# Patient Record
Sex: FEMALE | Race: ASIAN | HISPANIC OR LATINO | URBAN - METROPOLITAN AREA
[De-identification: names, ages, dates, MRNs, and addresses within clinical notes are randomized per-mention and may not be internally consistent; named-entity substitution may affect disease eponyms.]

---

## 2017-01-27 ENCOUNTER — EMERGENCY (EMERGENCY)
Facility: HOSPITAL | Age: 32
LOS: 1 days | Discharge: HOME | End: 2017-01-27
Attending: EMERGENCY MEDICINE | Admitting: EMERGENCY MEDICINE
Payer: MEDICAID

## 2017-01-27 VITALS
SYSTOLIC BLOOD PRESSURE: 134 MMHG | TEMPERATURE: 98 F | RESPIRATION RATE: 15 BRPM | OXYGEN SATURATION: 100 % | DIASTOLIC BLOOD PRESSURE: 95 MMHG | HEART RATE: 73 BPM

## 2017-01-27 PROCEDURE — 99283 EMERGENCY DEPT VISIT LOW MDM: CPT | Mod: 25

## 2017-01-27 PROCEDURE — 10120 INC&RMVL FB SUBQ TISS SMPL: CPT

## 2017-01-27 RX ADMIN — Medication 1 TABLET(S): at 23:04

## 2017-01-27 NOTE — ED PROVIDER NOTE - NS ED MD SCRIBE ATTENDING SCRIBE SECTIONS
PAST MEDICAL/SURGICAL/SOCIAL HISTORY/HISTORY OF PRESENT ILLNESS/DISPOSITION/VITAL SIGNS( Pullset)/REVIEW OF SYSTEMS/HIV/PHYSICAL EXAM

## 2017-01-27 NOTE — ED PROCEDURE NOTE - DETAILS:
Dr. Baird:  I have personally performed a face to face bedside history and physical examination of this patient. I have discussed the history, examination, review of systems, assessment and plan of management with the resident. I have reviewed the electronic medical record and amended it to reflect my history, review of systems, physical exam, assessment and plan.

## 2017-01-27 NOTE — ED ADULT TRIAGE NOTE - CHIEF COMPLAINT QUOTE
alert no distress  c/o toothache started 1 week ago now has persistent headache right ear and jaw pain all intermittent   took advil at 1800

## 2017-01-27 NOTE — ED PROVIDER NOTE - OBJECTIVE STATEMENT
30 yo F pt w/ no significant PMHx presents to the ED c/o upper right toothache x 1 week. Now complaining of persistent headache, intermittent ear/jaw pain. Has not seen dentist. Pt also endorses skin growing over the back piercing on upper lip - requests it be removed. Denies fevers, chills.

## 2017-01-27 NOTE — ED PROCEDURE NOTE - CPROC ED FOREIGN BODY DETAIL1
The area was draped and prepped and the anatomic location of the suspected foreign body was explored in a bloodless field.

## 2017-01-27 NOTE — ED PROCEDURE NOTE - CPROC ED POST PROC CARE GUIDE1
Verbal/written post procedure instructions were given to patient/caregiver./Instructed patient/caregiver to follow-up with primary care physician./Keep the cast/splint/dressing clean and dry./Instructed patient/caregiver regarding signs and symptoms of infection.

## 2017-01-27 NOTE — ED PROVIDER NOTE - MEDICAL DECISION MAKING DETAILS
30 yo M pt w/ likely pain from dental curtis. Plan for abx. Will remove upper lip stud as per pt requests. F/u dentist. 30 yo M pt w/ likely pain from dental caries. Plan for abx. Will remove upper lip stud as per pt requests. F/u dentist.  Discharge with return precautions.

## 2017-11-30 ENCOUNTER — APPOINTMENT (OUTPATIENT)
Dept: INTERNAL MEDICINE | Facility: CLINIC | Age: 32
End: 2017-11-30
Payer: COMMERCIAL

## 2017-11-30 VITALS
HEART RATE: 64 BPM | RESPIRATION RATE: 16 BRPM | TEMPERATURE: 98.4 F | BODY MASS INDEX: 24.16 KG/M2 | HEIGHT: 65 IN | DIASTOLIC BLOOD PRESSURE: 82 MMHG | SYSTOLIC BLOOD PRESSURE: 126 MMHG | OXYGEN SATURATION: 98 % | WEIGHT: 145 LBS

## 2017-11-30 DIAGNOSIS — Z82.49 FAMILY HISTORY OF ISCHEMIC HEART DISEASE AND OTHER DISEASES OF THE CIRCULATORY SYSTEM: ICD-10-CM

## 2017-11-30 DIAGNOSIS — Z78.9 OTHER SPECIFIED HEALTH STATUS: ICD-10-CM

## 2017-11-30 PROCEDURE — 99385 PREV VISIT NEW AGE 18-39: CPT | Mod: 25

## 2017-11-30 PROCEDURE — 90686 IIV4 VACC NO PRSV 0.5 ML IM: CPT

## 2017-11-30 PROCEDURE — 90471 IMMUNIZATION ADMIN: CPT

## 2017-12-05 LAB
ALBUMIN SERPL ELPH-MCNC: 4.8 G/DL
ALP BLD-CCNC: 68 U/L
ALT SERPL-CCNC: 13 U/L
ANION GAP SERPL CALC-SCNC: 19 MMOL/L
AST SERPL-CCNC: 23 U/L
BASOPHILS # BLD AUTO: 0.01 K/UL
BASOPHILS NFR BLD AUTO: 0.1 %
BILIRUB SERPL-MCNC: 0.6 MG/DL
BUN SERPL-MCNC: 12 MG/DL
CALCIUM SERPL-MCNC: 10.2 MG/DL
CHLORIDE SERPL-SCNC: 102 MMOL/L
CHOLEST SERPL-MCNC: 206 MG/DL
CHOLEST/HDLC SERPL: 3.1 RATIO
CO2 SERPL-SCNC: 21 MMOL/L
CREAT SERPL-MCNC: 1.04 MG/DL
EOSINOPHIL # BLD AUTO: 0.13 K/UL
EOSINOPHIL NFR BLD AUTO: 1.9 %
GLUCOSE SERPL-MCNC: 96 MG/DL
HCT VFR BLD CALC: 44.2 %
HDLC SERPL-MCNC: 67 MG/DL
HGB BLD-MCNC: 14.6 G/DL
IMM GRANULOCYTES NFR BLD AUTO: 0.3 %
LDLC SERPL CALC-MCNC: 113 MG/DL
LYMPHOCYTES # BLD AUTO: 2.44 K/UL
LYMPHOCYTES NFR BLD AUTO: 34.8 %
MAN DIFF?: NORMAL
MCHC RBC-ENTMCNC: 30 PG
MCHC RBC-ENTMCNC: 33 GM/DL
MCV RBC AUTO: 90.9 FL
MONOCYTES # BLD AUTO: 0.29 K/UL
MONOCYTES NFR BLD AUTO: 4.1 %
NEUTROPHILS # BLD AUTO: 4.12 K/UL
NEUTROPHILS NFR BLD AUTO: 58.8 %
PLATELET # BLD AUTO: 383 K/UL
POTASSIUM SERPL-SCNC: 4.5 MMOL/L
PROT SERPL-MCNC: 8.6 G/DL
RBC # BLD: 4.86 M/UL
RBC # FLD: 13.5 %
SODIUM SERPL-SCNC: 142 MMOL/L
T4 FREE SERPL-MCNC: 1.4 NG/DL
TRIGL SERPL-MCNC: 132 MG/DL
TSH SERPL-ACNC: 1.15 UIU/ML
WBC # FLD AUTO: 7.01 K/UL

## 2017-12-11 ENCOUNTER — TRANSCRIPTION ENCOUNTER (OUTPATIENT)
Age: 32
End: 2017-12-11

## 2018-03-01 ENCOUNTER — TRANSCRIPTION ENCOUNTER (OUTPATIENT)
Age: 33
End: 2018-03-01

## 2018-03-02 ENCOUNTER — TRANSCRIPTION ENCOUNTER (OUTPATIENT)
Age: 33
End: 2018-03-02

## 2018-03-02 ENCOUNTER — INPATIENT (INPATIENT)
Facility: HOSPITAL | Age: 33
LOS: 1 days | Discharge: HOME | DRG: 607 | End: 2018-03-04
Attending: INTERNAL MEDICINE | Admitting: INTERNAL MEDICINE
Payer: COMMERCIAL

## 2018-03-02 VITALS
RESPIRATION RATE: 16 BRPM | DIASTOLIC BLOOD PRESSURE: 70 MMHG | SYSTOLIC BLOOD PRESSURE: 100 MMHG | TEMPERATURE: 98 F | WEIGHT: 139.99 LBS | OXYGEN SATURATION: 98 % | HEART RATE: 64 BPM

## 2018-03-02 LAB
ALBUMIN SERPL ELPH-MCNC: 4.1 G/DL — SIGNIFICANT CHANGE UP (ref 3.3–5)
ALP SERPL-CCNC: 365 U/L — HIGH (ref 40–120)
ALT FLD-CCNC: 380 U/L RC — HIGH (ref 10–45)
ANION GAP SERPL CALC-SCNC: 18 MMOL/L — HIGH (ref 5–17)
AST SERPL-CCNC: 675 U/L — HIGH (ref 10–40)
BILIRUB SERPL-MCNC: 1 MG/DL — SIGNIFICANT CHANGE UP (ref 0.2–1.2)
BUN SERPL-MCNC: 18 MG/DL — SIGNIFICANT CHANGE UP (ref 7–23)
CALCIUM SERPL-MCNC: 8.6 MG/DL — SIGNIFICANT CHANGE UP (ref 8.4–10.5)
CHLORIDE SERPL-SCNC: 92 MMOL/L — LOW (ref 96–108)
CO2 SERPL-SCNC: 21 MMOL/L — LOW (ref 22–31)
CREAT SERPL-MCNC: 1.4 MG/DL — HIGH (ref 0.5–1.3)
GLUCOSE SERPL-MCNC: 106 MG/DL — HIGH (ref 70–99)
HCT VFR BLD CALC: 44.9 % — SIGNIFICANT CHANGE UP (ref 34.5–45)
HGB BLD-MCNC: 15.6 G/DL — HIGH (ref 11.5–15.5)
MCHC RBC-ENTMCNC: 31.7 PG — SIGNIFICANT CHANGE UP (ref 27–34)
MCHC RBC-ENTMCNC: 34.7 GM/DL — SIGNIFICANT CHANGE UP (ref 32–36)
MCV RBC AUTO: 91.2 FL — SIGNIFICANT CHANGE UP (ref 80–100)
PLATELET # BLD AUTO: 164 K/UL — SIGNIFICANT CHANGE UP (ref 150–400)
POTASSIUM SERPL-MCNC: 4 MMOL/L — SIGNIFICANT CHANGE UP (ref 3.5–5.3)
POTASSIUM SERPL-SCNC: 4 MMOL/L — SIGNIFICANT CHANGE UP (ref 3.5–5.3)
PROT SERPL-MCNC: 8.7 G/DL — HIGH (ref 6–8.3)
RBC # BLD: 4.93 M/UL — SIGNIFICANT CHANGE UP (ref 3.8–5.2)
RBC # FLD: 11.6 % — SIGNIFICANT CHANGE UP (ref 10.3–14.5)
SODIUM SERPL-SCNC: 131 MMOL/L — LOW (ref 135–145)
WBC # BLD: 2.4 K/UL — LOW (ref 3.8–10.5)
WBC # FLD AUTO: 2.4 K/UL — LOW (ref 3.8–10.5)

## 2018-03-02 PROCEDURE — 99285 EMERGENCY DEPT VISIT HI MDM: CPT | Mod: 25

## 2018-03-02 RX ORDER — DIPHENHYDRAMINE HCL 50 MG
25 CAPSULE ORAL ONCE
Qty: 0 | Refills: 0 | Status: COMPLETED | OUTPATIENT
Start: 2018-03-02 | End: 2018-03-02

## 2018-03-02 RX ORDER — ONDANSETRON 8 MG/1
4 TABLET, FILM COATED ORAL ONCE
Qty: 0 | Refills: 0 | Status: COMPLETED | OUTPATIENT
Start: 2018-03-02 | End: 2018-03-02

## 2018-03-02 RX ORDER — SODIUM CHLORIDE 9 MG/ML
1000 INJECTION INTRAMUSCULAR; INTRAVENOUS; SUBCUTANEOUS ONCE
Qty: 0 | Refills: 0 | Status: COMPLETED | OUTPATIENT
Start: 2018-03-02 | End: 2018-03-02

## 2018-03-02 RX ORDER — ACETAMINOPHEN 500 MG
650 TABLET ORAL ONCE
Qty: 0 | Refills: 0 | Status: COMPLETED | OUTPATIENT
Start: 2018-03-02 | End: 2018-03-02

## 2018-03-02 RX ORDER — FAMOTIDINE 10 MG/ML
20 INJECTION INTRAVENOUS ONCE
Qty: 0 | Refills: 0 | Status: COMPLETED | OUTPATIENT
Start: 2018-03-02 | End: 2018-03-02

## 2018-03-02 RX ADMIN — Medication 650 MILLIGRAM(S): at 22:20

## 2018-03-02 RX ADMIN — Medication 25 MILLIGRAM(S): at 22:21

## 2018-03-02 RX ADMIN — SODIUM CHLORIDE 2000 MILLILITER(S): 9 INJECTION INTRAMUSCULAR; INTRAVENOUS; SUBCUTANEOUS at 22:21

## 2018-03-02 RX ADMIN — ONDANSETRON 4 MILLIGRAM(S): 8 TABLET, FILM COATED ORAL at 22:21

## 2018-03-02 NOTE — ED PROVIDER NOTE - OBJECTIVE STATEMENT
32F with no PMH presenting with fever x2 days with worsening rash with new onset nausea and vomiting today. Patient was seen in urgent care 2 days ago for symptoms and was prescribed Tamiflu after testing negative on UA, rapid strep, and mono. Patient states that she recently completed a course of Bactrim for UTI several days ago. Rash began on the same day as when Bactrim was discontinued. Nausea and vomiting began today. LMP today. Denies chills, cp, sob, sore throat, diarrhea, sick contacts, immunizations up to date.

## 2018-03-02 NOTE — ED ADULT NURSE NOTE - OBJECTIVE STATEMENT
Received patient awake and alert x 4, presenting to the ED with n/v +fever x 2 days. Patient states she went to an urgent care 2 days ago and was prescribed Tamiflu for her symptoms. As per patient she had recently completed her course of Bactrim for UTI. +rash on B/L extremities. As per patient rash started when the Bactrim was complete. Denies any chills, no CP/SOB. Breathing unlabored with no S/S acute distress noted, safety maintained, will continue to monitor.

## 2018-03-02 NOTE — ED PROVIDER NOTE - ATTENDING CONTRIBUTION TO CARE
I have seen and evaluated this patient with the resident.   I agree with the findings  unless other wise stated.  I have made appropriate changes in documentations where needed, After my face to face bedside evaluation, I am further  notinF presenting with rash, nausea, vomiting. Possible drug reaction. Will order cbc, cmp, fluids, benadryl, tylenol, and reassessed pt has elevated LFT GB US and follow up possible hepatitis for obs --Stewart

## 2018-03-02 NOTE — ED PROVIDER NOTE - MEDICAL DECISION MAKING DETAILS
32F presenting with rash, nausea, vomiting. Possible drug reaction. Will order cbc, cmp, fluids, benadryl, tylenol, and reassess

## 2018-03-02 NOTE — ED PROVIDER NOTE - PROGRESS NOTE DETAILS
Attending MD Del Valle.  Pt signed out to me in stable condition pending Labs, hydrate, send home, UTI recently, took 1 wk bactrim, stopped 2 days ago because developed drug rash, pruritic, no throat/lung involvement, dry appearing, limited PO, check labs/electrolytes, completed course bactrim, no further abxs necessary given completion of course and resolution of UTI sxs. Janki: patient with improvement of symptoms. Labwork significant for elevated LFTs. No hx of alcohol use or hepatitis. Will order RUQ US and hepatitis panel Janki: US significant for cholelithiasis with mild intra hepatic ductal dilitation. Patient to be considered for CDU for MRCP Janki: US significant for cholelithiasis with mild intra hepatic ductal dilitation. Patient with no hx of abdominal pain. Benign abdominal exam. Patient to be considered for CDU for MRCP ARMY:  PT seen for prob drug rash s/p completion of bactrim course for UTI tx.  Pt's LFT's found to be elevated and RUQ sono c/w cholelithiasis and dilated intrahepatic ducts.  MRCP for further eval warranted.  Hepatitis panel ordered.  Planned MRCP, hepatitis panel, repeat labs and reassessment in AM.  Stable for transfer of care to CDU/observation unit for above.

## 2018-03-02 NOTE — ED PROVIDER NOTE - CARE PLAN
Principal Discharge DX:	Elevated LFTs Principal Discharge DX:	Elevated LFTs  Secondary Diagnosis:	Febrile illness, acute

## 2018-03-02 NOTE — ED PROVIDER NOTE - PMH
No pertinent past medical history No pertinent past medical history    No pertinent past medical history

## 2018-03-03 DIAGNOSIS — R21 RASH AND OTHER NONSPECIFIC SKIN ERUPTION: ICD-10-CM

## 2018-03-03 DIAGNOSIS — R79.89 OTHER SPECIFIED ABNORMAL FINDINGS OF BLOOD CHEMISTRY: ICD-10-CM

## 2018-03-03 LAB
ALBUMIN SERPL ELPH-MCNC: 3.2 G/DL — LOW (ref 3.3–5)
ALP SERPL-CCNC: 320 U/L — HIGH (ref 40–120)
ALT FLD-CCNC: 306 U/L RC — HIGH (ref 10–45)
ANION GAP SERPL CALC-SCNC: 17 MMOL/L — SIGNIFICANT CHANGE UP (ref 5–17)
AST SERPL-CCNC: 537 U/L — HIGH (ref 10–40)
BASOPHILS # BLD AUTO: 0 K/UL — SIGNIFICANT CHANGE UP (ref 0–0.2)
BILIRUB SERPL-MCNC: 1 MG/DL — SIGNIFICANT CHANGE UP (ref 0.2–1.2)
BUN SERPL-MCNC: 13 MG/DL — SIGNIFICANT CHANGE UP (ref 7–23)
CALCIUM SERPL-MCNC: 7.5 MG/DL — LOW (ref 8.4–10.5)
CHLORIDE SERPL-SCNC: 100 MMOL/L — SIGNIFICANT CHANGE UP (ref 96–108)
CMV IGG FLD QL: <0.2 U/ML — SIGNIFICANT CHANGE UP
CMV IGG SERPL-IMP: NEGATIVE — SIGNIFICANT CHANGE UP
CMV IGM FLD-ACNC: <8 AU/ML — SIGNIFICANT CHANGE UP
CMV IGM SERPL QL: NEGATIVE — SIGNIFICANT CHANGE UP
CO2 SERPL-SCNC: 18 MMOL/L — LOW (ref 22–31)
CREAT SERPL-MCNC: 1.06 MG/DL — SIGNIFICANT CHANGE UP (ref 0.5–1.3)
EBV EA AB SER IA-ACNC: 11.8 U/ML — HIGH
EBV EA AB TITR SER IF: POSITIVE
EBV EA IGG SER-ACNC: POSITIVE
EBV NA IGG SER IA-ACNC: 200 U/ML — HIGH
EBV PATRN SPEC IB-IMP: SIGNIFICANT CHANGE UP
EBV VCA IGG AVIDITY SER QL IA: POSITIVE
EBV VCA IGM SER IA-ACNC: 248 U/ML — HIGH
EBV VCA IGM SER IA-ACNC: <10 U/ML — SIGNIFICANT CHANGE UP
EBV VCA IGM TITR FLD: NEGATIVE — SIGNIFICANT CHANGE UP
EOSINOPHIL # BLD AUTO: 0 K/UL — SIGNIFICANT CHANGE UP (ref 0–0.5)
EOSINOPHIL NFR BLD AUTO: 1 % — SIGNIFICANT CHANGE UP (ref 0–6)
GLUCOSE SERPL-MCNC: 93 MG/DL — SIGNIFICANT CHANGE UP (ref 70–99)
HAV IGM SER-ACNC: SIGNIFICANT CHANGE UP
HBV CORE IGM SER-ACNC: SIGNIFICANT CHANGE UP
HBV SURFACE AG SER-ACNC: SIGNIFICANT CHANGE UP
HCG SERPL-ACNC: <2 MIU/ML — LOW (ref 5–24)
HCT VFR BLD CALC: 38.1 % — SIGNIFICANT CHANGE UP (ref 34.5–45)
HCV AB S/CO SERPL IA: 0.13 S/CO — SIGNIFICANT CHANGE UP
HCV AB SERPL-IMP: SIGNIFICANT CHANGE UP
HETEROPH AB TITR SER AGGL: NEGATIVE — SIGNIFICANT CHANGE UP
HGB BLD-MCNC: 13.5 G/DL — SIGNIFICANT CHANGE UP (ref 11.5–15.5)
HIV 1 & 2 AB SERPL IA.RAPID: SIGNIFICANT CHANGE UP
LIDOCAIN IGE QN: 56 U/L — SIGNIFICANT CHANGE UP (ref 7–60)
LYMPHOCYTES # BLD AUTO: 0.3 K/UL — LOW (ref 1–3.3)
LYMPHOCYTES # BLD AUTO: 15 % — SIGNIFICANT CHANGE UP (ref 13–44)
MCHC RBC-ENTMCNC: 32.1 PG — SIGNIFICANT CHANGE UP (ref 27–34)
MCHC RBC-ENTMCNC: 35.5 GM/DL — SIGNIFICANT CHANGE UP (ref 32–36)
MCV RBC AUTO: 90.5 FL — SIGNIFICANT CHANGE UP (ref 80–100)
MONOCYTES # BLD AUTO: 0 K/UL — SIGNIFICANT CHANGE UP (ref 0–0.9)
MONOCYTES NFR BLD AUTO: 4 % — SIGNIFICANT CHANGE UP (ref 2–14)
NEUTROPHILS # BLD AUTO: 2 K/UL — SIGNIFICANT CHANGE UP (ref 1.8–7.4)
NEUTROPHILS NFR BLD AUTO: 69 % — SIGNIFICANT CHANGE UP (ref 43–77)
PLATELET # BLD AUTO: 151 K/UL — SIGNIFICANT CHANGE UP (ref 150–400)
POTASSIUM SERPL-MCNC: 3.5 MMOL/L — SIGNIFICANT CHANGE UP (ref 3.5–5.3)
POTASSIUM SERPL-SCNC: 3.5 MMOL/L — SIGNIFICANT CHANGE UP (ref 3.5–5.3)
PROT SERPL-MCNC: 7 G/DL — SIGNIFICANT CHANGE UP (ref 6–8.3)
RAPID RVP RESULT: SIGNIFICANT CHANGE UP
RBC # BLD: 4.21 M/UL — SIGNIFICANT CHANGE UP (ref 3.8–5.2)
RBC # FLD: 11.6 % — SIGNIFICANT CHANGE UP (ref 10.3–14.5)
SODIUM SERPL-SCNC: 135 MMOL/L — SIGNIFICANT CHANGE UP (ref 135–145)
WBC # BLD: 1.6 K/UL — LOW (ref 3.8–10.5)
WBC # FLD AUTO: 1.6 K/UL — LOW (ref 3.8–10.5)

## 2018-03-03 PROCEDURE — 99222 1ST HOSP IP/OBS MODERATE 55: CPT | Mod: GC

## 2018-03-03 PROCEDURE — 71046 X-RAY EXAM CHEST 2 VIEWS: CPT | Mod: 26

## 2018-03-03 PROCEDURE — 99220: CPT

## 2018-03-03 PROCEDURE — 76705 ECHO EXAM OF ABDOMEN: CPT | Mod: 26

## 2018-03-03 PROCEDURE — 74181 MRI ABDOMEN W/O CONTRAST: CPT | Mod: 26

## 2018-03-03 RX ORDER — ACETAMINOPHEN 500 MG
650 TABLET ORAL ONCE
Qty: 0 | Refills: 0 | Status: COMPLETED | OUTPATIENT
Start: 2018-03-03 | End: 2018-03-03

## 2018-03-03 RX ORDER — SODIUM CHLORIDE 9 MG/ML
1000 INJECTION INTRAMUSCULAR; INTRAVENOUS; SUBCUTANEOUS
Qty: 0 | Refills: 0 | Status: DISCONTINUED | OUTPATIENT
Start: 2018-03-03 | End: 2018-03-04

## 2018-03-03 RX ORDER — IBUPROFEN 200 MG
600 TABLET ORAL ONCE
Qty: 0 | Refills: 0 | Status: COMPLETED | OUTPATIENT
Start: 2018-03-03 | End: 2018-03-03

## 2018-03-03 RX ORDER — DIPHENHYDRAMINE HCL 50 MG
50 CAPSULE ORAL EVERY 4 HOURS
Qty: 0 | Refills: 0 | Status: DISCONTINUED | OUTPATIENT
Start: 2018-03-03 | End: 2018-03-04

## 2018-03-03 RX ORDER — KETOROLAC TROMETHAMINE 30 MG/ML
30 SYRINGE (ML) INJECTION ONCE
Qty: 0 | Refills: 0 | Status: DISCONTINUED | OUTPATIENT
Start: 2018-03-03 | End: 2018-03-03

## 2018-03-03 RX ORDER — ACETAMINOPHEN 500 MG
650 TABLET ORAL EVERY 6 HOURS
Qty: 0 | Refills: 0 | Status: DISCONTINUED | OUTPATIENT
Start: 2018-03-03 | End: 2018-03-04

## 2018-03-03 RX ORDER — IBUPROFEN 200 MG
1 TABLET ORAL
Qty: 0 | Refills: 0 | COMMUNITY

## 2018-03-03 RX ORDER — HEPARIN SODIUM 5000 [USP'U]/ML
5000 INJECTION INTRAVENOUS; SUBCUTANEOUS EVERY 12 HOURS
Qty: 0 | Refills: 0 | Status: DISCONTINUED | OUTPATIENT
Start: 2018-03-03 | End: 2018-03-04

## 2018-03-03 RX ORDER — ONDANSETRON 8 MG/1
4 TABLET, FILM COATED ORAL ONCE
Qty: 0 | Refills: 0 | Status: COMPLETED | OUTPATIENT
Start: 2018-03-03 | End: 2018-03-03

## 2018-03-03 RX ADMIN — Medication 600 MILLIGRAM(S): at 08:45

## 2018-03-03 RX ADMIN — Medication 650 MILLIGRAM(S): at 03:53

## 2018-03-03 RX ADMIN — SODIUM CHLORIDE 120 MILLILITER(S): 9 INJECTION INTRAMUSCULAR; INTRAVENOUS; SUBCUTANEOUS at 04:13

## 2018-03-03 RX ADMIN — Medication 650 MILLIGRAM(S): at 19:03

## 2018-03-03 RX ADMIN — ONDANSETRON 4 MILLIGRAM(S): 8 TABLET, FILM COATED ORAL at 07:55

## 2018-03-03 RX ADMIN — Medication 600 MILLIGRAM(S): at 07:55

## 2018-03-03 RX ADMIN — FAMOTIDINE 20 MILLIGRAM(S): 10 INJECTION INTRAVENOUS at 00:13

## 2018-03-03 RX ADMIN — Medication 30 MILLIGRAM(S): at 15:00

## 2018-03-03 RX ADMIN — Medication 30 MILLIGRAM(S): at 14:30

## 2018-03-03 NOTE — CONSULT NOTE ADULT - ASSESSMENT
32F with no PMH presenting with fever x2 days with worsening rash with new onset nausea and vomiting today. Patient was seen in urgent care 2 days ago for symptoms and was prescribed Tamiflu after testing negative on UA, rapid strep, and mono. Patient states that she recently completed a course of Bactrim for UTI several days ago. Rash began on the same day as when Bactrim was discontinued. Nausea and vomiting began today. LMP today. Denies chills, cp,    timeline- uti symtoms- oupt abx  course, did not improve- 4 to 5 days later- bactrim received 6 days, last dose monday- fever while on bactrim, and rash on wednesday, wednesday-fever and non speicific symptoms- urgent care- tamiflu- nausea and vomitting- noticed rash on chest and arms itching, now itching gone but rash persists    vitals-99 low grade  labs- leukopenic,  liver enzyme altered  cmv serology and EBV serology sent   hiv negative     We think that the rash, fever came as side effect of bactrim, the timeline is very suggestive of that, no eosinophils though but that does not rule in or out,   No other etiology obvious on hisotry like rheum, can send rheum wok up like esr, crp, marta to be sure,   send blood cx in the meantime, since she has a rash, and low grade temp here   will f/u ebv and c,mv  will dw attending 32F with no PMH, recent UTI outpatient, was given some antibiotic that was not effective so her GYN gave her bactrim but on bactrim she developed fever, rash, nausea and vomiting, she stopped the bactrim and went to urgent care, RVP, strep and u/a were negative but was still given tamiflu.  Now rash is improving, no more fever or vomiting, no urinary symptoms, was found to have leukopenia 1. 6 with 11 bandemia, HIV negative    fever, rash, vomiting likely drug reaction to bactrim, already improving after discontinuing the bactrim  f/u the CMV and EBV  monitor the CBC

## 2018-03-03 NOTE — H&P ADULT - HISTORY OF PRESENT ILLNESS
32F with no PMH presenting with fever x2 days with worsening rash with new onset nausea and vomiting today. Patient was seen in urgent care 2 days ago for symptoms and was prescribed Tamiflu after testing negative on UA, rapid strep, and mono. Patient states that she recently completed a course of Bactrim for UTI several days ago. Rash began on the same day as when Bactrim was discontinued. Nausea and vomiting began today. LMP today. Denies chills, cp, sob, sore throat, diarrhea, sick contacts, immunizations up to date.  Patient has been seen by ID in the ED

## 2018-03-03 NOTE — ED CDU PROVIDER INITIAL DAY NOTE - ATTENDING CONTRIBUTION TO CARE
ARMY:  PT seen for prob drug rash s/p completion of bactrim course for UTI tx.  Pt's LFT's found to be elevated and RUQ sono c/w cholelithiasis and dilated intrahepatic ducts.  MRCP for further eval warranted.  Hepatitis panel ordered.  Planned MRCP, hepatitis panel, repeat labs and reassessment in AM.  Stable for transfer of care to CDU/observation unit for above.

## 2018-03-03 NOTE — ED CDU PROVIDER DISPOSITION NOTE - CLINICAL COURSE
33 yo female with no PMHx presented to ED c/o new onset nausea/vomiting x1 day and a worsening rash that began after stopping Bactrim for a UTI 2 days prior. Pt was seen at urgent care for symptoms and was prescribed Tamiflu after negative UA, rapid strep, and mono testing was done. Labwork in ED revealed elevated LFTs and US of abdomen revealed cholelithiasis but no acute cholecystitis with mild intrahepatic biliary ductal dilatation. Pt sent to CDU for IVF, pain control, and MRCP with frequent evals. Pt did well overnight and responded well to PO tylenol. MRCP in AM revealed___________ 33 yo female with no PMHx presented to ED c/o new onset nausea/vomiting x1 day and a worsening rash that began after stopping Bactrim for a UTI 2 days prior. Pt was seen at urgent care for symptoms and was prescribed Tamiflu after negative UA, rapid strep, and mono testing was done. Labwork in ED revealed elevated LFTs and US of abdomen revealed cholelithiasis but no acute cholecystitis with mild intrahepatic biliary ductal dilatation. Pt sent to CDU for IVF, pain control, and MRCP with frequent evals. Pt did well overnight and responded well to PO tylenol.  Patient's repeat labs notable for leukopenia and bandemia.  LFTs remained elevated.  Plan to admit for ID consult and further evaluation.

## 2018-03-03 NOTE — ED CDU PROVIDER INITIAL DAY NOTE - PROGRESS NOTE DETAILS
Patient seen at bedside in NAD.  VSS.  Patient resting comfortably.  patient c/o "feeling warm" and some mild nausea.  Temp 99.9 orally.  Will give motrin and zofran and reassess.  Awaiting MRCP. -Hamzah Eason PA-C

## 2018-03-03 NOTE — ED CDU PROVIDER DISPOSITION NOTE - ATTENDING CONTRIBUTION TO CARE
Pt seen and examined. 32y F presenting to ED with co NV, rash, fevers. S/p two doses Tamiflu and a course of Bactrim for UTI. Bactrim stopped 2 days ago. Dispo to obs for MRCP in setting of elevated LFTs and dilated biliary ducts. In obs pt with low grade fevers. Labs notable for leukopenia and bandemia as well as transaminitis. Plan to admit pt for further evaluation of presumed underlying infectious etiology as cause for sx. Additional labs including HIV, EBV, CMV and RVP sent. ID consulted.

## 2018-03-03 NOTE — H&P ADULT - ASSESSMENT
32F with no PMH presenting with fever x2 days with worsening rash with new onset nausea and vomiting today. Patient was seen in urgent care 2 days ago for symptoms and was prescribed Tamiflu after testing negative on UA, rapid strep, and mono. Patient states that she recently completed a course of Bactrim for UTI several days ago. Rash began on the same day as when Bactrim was discontinued. Nausea and vomiting began today. LMP today. Denies chills, cp, sob, sore throat, diarrhea, sick contacts, immunizations up to date.  CT of Abdomen reveals GB stones. MR confirms, no cholecystitis reported

## 2018-03-03 NOTE — ED ADULT NURSE REASSESSMENT NOTE - NS ED NURSE REASSESS COMMENT FT1
Pt received from NABILA Newell. Pt oriented to CDU & plan of care was discussed. Pt endorses fever and chills and occasional nausea. Pt states she has a rash throughout her body. Rash is reddened. Safety & comfort measures maintained. Call bell in reach. Will continue to monitor.

## 2018-03-03 NOTE — ED CDU PROVIDER INITIAL DAY NOTE - OBJECTIVE STATEMENT
Pt is a 33 yo female with no PMHx presenting with fever x2 days with worsening rash with new onset nausea and vomiting today. Patient was seen in urgent care 2 days ago for symptoms and was prescribed Tamiflu after testing negative on UA, rapid strep, and mono. Patient states that she recently completed a course of Bactrim for UTI several days ago. Rash began on the same day as when Bactrim was discontinued. Nausea and vomiting began today. LMP today. Denies chills, cp, sob, sore throat, diarrhea, sick contacts, immunizations up to date.    In ED pt found to have elevated LFTs with AST and /380 respectively on labwork. RUQ US was done which revealed cholelithiasis without sonographic evidence for acute cholecystitis and mild intrahepatic biliary ductal dilatation with a normal caliber CBD. Pt was not complaining of any abdominal pain and had no episodes of emesis while in ED. Pt was sent to CDU for further evaluation with MRCP, frequent evals, pain control, IVF. Case discussed w/ ED attending Dr. Del Valle. Pt is a 33 yo female with no PMHx presenting with fever x2 days with worsening rash with new onset nausea and vomiting today. Patient was seen in urgent care 2 days ago for symptoms and was prescribed Tamiflu after testing negative on UA, rapid strep, and mono. Patient states that she recently completed a course of Bactrim for UTI several days ago. Rash began on the same day as when Bactrim was discontinued. Nausea and vomiting began today. Reports subjective chills and fevers at home. LMP today. Denies cp, sob, sore throat, dysuria or frequency, or recent sick contact/travel.    In ED pt found to have elevated LFTs with AST and /380 respectively on labwork. RUQ US was done which revealed cholelithiasis without sonographic evidence for acute cholecystitis and mild intrahepatic biliary ductal dilatation with a normal caliber CBD. Pt was not complaining of any abdominal pain and had no episodes of emesis while in ED. Pt was sent to CDU for further evaluation with MRCP, frequent evals, pain control, IVF. Case discussed w/ ED attending Dr. Del Valle. Pt is a 33 yo female with no PMHx presenting with fever x2 days with worsening rash with new onset nausea and vomiting today. Patient was seen in urgent care 2 days ago for symptoms and was prescribed Tamiflu after testing negative on UA, rapid strep, and mono. Patient states that she recently completed a course of Bactrim for UTI several days ago. Rash began on the same day as when Bactrim was discontinued. Nausea and vomiting began today. Reports subjective chills and fevers at home. LMP today. Denies cp, sob, sore throat, dysuria or frequency, or recent sick contact/travel.    In ED pt found to have elevated LFTs with AST and /380 respectively on labwork. RUQ US was done which revealed cholelithiasis without sonographic evidence for acute cholecystitis and mild intrahepatic biliary ductal dilatation with a normal caliber CBD. Pt was not complaining of any abdominal pain and had no episodes of emesis while in ED. Pt was sent to CDU for further evaluation with MRCP, frequent evals, pain control, IVF. Case discussed w/ ED attending Dr. Del Valle who instructed if MRCP negative and pt stable can d/c with gastro follow up.

## 2018-03-03 NOTE — ED CDU PROVIDER DISPOSITION NOTE - PLAN OF CARE
1. Follow up with your PMD in the next 1-2 days.  2. Additionally follow up with our gastroenterology clinic ([phone]) in the next 2-3 days for further evaluation and management regarding your symptoms and abnormal lab results. Please bring a copy of all your lab results with you to your appointments.  3. Rest and stay hydrated. Drink lots of water and fluids. Take 1 tab zofran every 6 hours as needed for nausea.  4. Return to the ED immediately if you develop any worsening symptoms, continued nausea/vomiting, fever/chills, chest pain, abdominal pain, or all other concerns.

## 2018-03-04 ENCOUNTER — TRANSCRIPTION ENCOUNTER (OUTPATIENT)
Age: 33
End: 2018-03-04

## 2018-03-04 VITALS
OXYGEN SATURATION: 98 % | DIASTOLIC BLOOD PRESSURE: 72 MMHG | SYSTOLIC BLOOD PRESSURE: 109 MMHG | TEMPERATURE: 99 F | RESPIRATION RATE: 16 BRPM | HEART RATE: 70 BPM

## 2018-03-04 LAB
ALBUMIN SERPL ELPH-MCNC: 2.9 G/DL — LOW (ref 3.3–5)
ALP SERPL-CCNC: 333 U/L — HIGH (ref 40–120)
ALT FLD-CCNC: 279 U/L RC — HIGH (ref 10–45)
ANION GAP SERPL CALC-SCNC: 13 MMOL/L — SIGNIFICANT CHANGE UP (ref 5–17)
AST SERPL-CCNC: 484 U/L — HIGH (ref 10–40)
BASOPHILS # BLD AUTO: 0.02 K/UL — SIGNIFICANT CHANGE UP (ref 0–0.2)
BASOPHILS NFR BLD AUTO: 1.4 % — SIGNIFICANT CHANGE UP (ref 0–2)
BILIRUB DIRECT SERPL-MCNC: 0.3 MG/DL — HIGH (ref 0–0.2)
BILIRUB INDIRECT FLD-MCNC: 0.4 MG/DL — SIGNIFICANT CHANGE UP (ref 0.2–1)
BILIRUB SERPL-MCNC: 0.7 MG/DL — SIGNIFICANT CHANGE UP (ref 0.2–1.2)
BUN SERPL-MCNC: 7 MG/DL — SIGNIFICANT CHANGE UP (ref 7–23)
CALCIUM SERPL-MCNC: 7.6 MG/DL — LOW (ref 8.4–10.5)
CHLORIDE SERPL-SCNC: 103 MMOL/L — SIGNIFICANT CHANGE UP (ref 96–108)
CO2 SERPL-SCNC: 20 MMOL/L — LOW (ref 22–31)
CREAT SERPL-MCNC: 0.83 MG/DL — SIGNIFICANT CHANGE UP (ref 0.5–1.3)
EOSINOPHIL # BLD AUTO: 0.03 K/UL — SIGNIFICANT CHANGE UP (ref 0–0.5)
EOSINOPHIL NFR BLD AUTO: 2.1 % — SIGNIFICANT CHANGE UP (ref 0–6)
GLUCOSE SERPL-MCNC: 92 MG/DL — SIGNIFICANT CHANGE UP (ref 70–99)
HCT VFR BLD CALC: 34 % — LOW (ref 34.5–45)
HGB BLD-MCNC: 11.7 G/DL — SIGNIFICANT CHANGE UP (ref 11.5–15.5)
IMM GRANULOCYTES NFR BLD AUTO: 0 % — SIGNIFICANT CHANGE UP (ref 0–1.5)
LYMPHOCYTES # BLD AUTO: 0.59 K/UL — LOW (ref 1–3.3)
LYMPHOCYTES # BLD AUTO: 42.1 % — SIGNIFICANT CHANGE UP (ref 13–44)
MANUAL SMEAR VERIFICATION: SIGNIFICANT CHANGE UP
MCHC RBC-ENTMCNC: 30 PG — SIGNIFICANT CHANGE UP (ref 27–34)
MCHC RBC-ENTMCNC: 34.4 GM/DL — SIGNIFICANT CHANGE UP (ref 32–36)
MCV RBC AUTO: 87.2 FL — SIGNIFICANT CHANGE UP (ref 80–100)
MONOCYTES # BLD AUTO: 0.12 K/UL — SIGNIFICANT CHANGE UP (ref 0–0.9)
MONOCYTES NFR BLD AUTO: 8.6 % — SIGNIFICANT CHANGE UP (ref 2–14)
NEUTROPHILS # BLD AUTO: 0.64 K/UL — LOW (ref 1.8–7.4)
NEUTROPHILS NFR BLD AUTO: 45.8 % — SIGNIFICANT CHANGE UP (ref 43–77)
PLAT MORPH BLD: NORMAL — SIGNIFICANT CHANGE UP
PLATELET # BLD AUTO: 156 K/UL — SIGNIFICANT CHANGE UP (ref 150–400)
POTASSIUM SERPL-MCNC: 3.8 MMOL/L — SIGNIFICANT CHANGE UP (ref 3.5–5.3)
POTASSIUM SERPL-SCNC: 3.8 MMOL/L — SIGNIFICANT CHANGE UP (ref 3.5–5.3)
PROT SERPL-MCNC: 6.4 G/DL — SIGNIFICANT CHANGE UP (ref 6–8.3)
RBC # BLD: 3.9 M/UL — SIGNIFICANT CHANGE UP (ref 3.8–5.2)
RBC # FLD: 13.3 % — SIGNIFICANT CHANGE UP (ref 10.3–14.5)
RBC BLD AUTO: SIGNIFICANT CHANGE UP
SODIUM SERPL-SCNC: 136 MMOL/L — SIGNIFICANT CHANGE UP (ref 135–145)
WBC # BLD: 1.4 K/UL — LOW (ref 3.8–10.5)
WBC # FLD AUTO: 1.4 K/UL — LOW (ref 3.8–10.5)

## 2018-03-04 PROCEDURE — 99232 SBSQ HOSP IP/OBS MODERATE 35: CPT | Mod: GC

## 2018-03-04 RX ORDER — ACETAMINOPHEN 500 MG
650 TABLET ORAL ONCE
Qty: 0 | Refills: 0 | Status: COMPLETED | OUTPATIENT
Start: 2018-03-04 | End: 2018-03-04

## 2018-03-04 RX ORDER — DIPHENHYDRAMINE HCL 50 MG
1 CAPSULE ORAL
Qty: 0 | Refills: 0 | COMMUNITY

## 2018-03-04 RX ADMIN — Medication 650 MILLIGRAM(S): at 03:00

## 2018-03-04 RX ADMIN — SODIUM CHLORIDE 120 MILLILITER(S): 9 INJECTION INTRAMUSCULAR; INTRAVENOUS; SUBCUTANEOUS at 05:53

## 2018-03-04 RX ADMIN — Medication 650 MILLIGRAM(S): at 02:23

## 2018-03-04 RX ADMIN — HEPARIN SODIUM 5000 UNIT(S): 5000 INJECTION INTRAVENOUS; SUBCUTANEOUS at 05:53

## 2018-03-04 NOTE — PROGRESS NOTE ADULT - SUBJECTIVE AND OBJECTIVE BOX
Follow Up:  rash, leukopenia, deranges LFTs    Interval History: pt stable and afebrile, no acute evets    ROS:      All other systems negative    Constitutional: no fever, no chills  HEENT:  no vision changes, no sore throat, no rhinorrhea  Cardiovascular:  no chest pain, no palpitation  Respiratory:  no SOB, no cough  GI:  no abd pain, no vomiting, no diarrhea  urinary: no dysuria, no hematuria, no flank pain  musculoskeletal:  no joint pain, no joint swelling  skin: improving rash  neurology:  no headache, no seizure, no change in mental status        Allergies  Bactrim (Fever; Rash)        ANTIMICROBIALS:      OTHER MEDS:  acetaminophen   Tablet 650 milliGRAM(s) Oral every 6 hours PRN  diphenhydrAMINE   Capsule 50 milliGRAM(s) Oral every 4 hours PRN  heparin  Injectable 5000 Unit(s) SubCutaneous every 12 hours  sodium chloride 0.9%. 1000 milliLiter(s) IV Continuous <Continuous>      Vital Signs Last 24 Hrs  T(C): 37.3 (04 Mar 2018 05:46), Max: 38.1 (03 Mar 2018 19:01)  T(F): 99.2 (04 Mar 2018 05:46), Max: 100.5 (03 Mar 2018 19:01)  HR: 75 (04 Mar 2018 05:46) (69 - 80)  BP: 105/62 (04 Mar 2018 05:46) (101/68 - 109/67)  BP(mean): --  RR: 18 (04 Mar 2018 05:46) (17 - 18)  SpO2: 97% (04 Mar 2018 05:46) (95% - 99%)    Physical Exam:  General:    NAD,  non toxic, A&O x 3  HEENT:    no oropharyngeal lesions,   no LAD,   neck supple  Cardio:     regular S1, S2,  no murmur  Respiratory:    clear b/l,    no wheezing  abd:     soft,   BS +,   no tenderness,    no organomegaly  :   no CVAT,  no suprapubic tenderness,   no  coreas  Musculoskeletal:   no joint swelling,   no edema  vascular: no lines, normal pulses  Skin:    improving erythematous rash on face and torse  Neurologic:     no focal deficit  psych: normal affect, no suicidal ideation                          11.7   1.40  )-----------( 156      ( 04 Mar 2018 08:29 )             34.0       03-04    136  |  103  |  7   ----------------------------<  92  3.8   |  20<L>  |  0.83    Ca    7.6<L>      04 Mar 2018 07:11    TPro  6.4  /  Alb  2.9<L>  /  TBili  0.7  /  DBili  0.3<H>  /  AST  484<H>  /  ALT  279<H>  /  AlkPhos  333<H>  03-04          MICROBIOLOGY:  v    CMV IgG Antibody: <0.20 U/mL (03-03-18 @ 14:27)    Rapid RVP Result: NotDetec (03-03 @ 10:39)        RADIOLOGY:    < from: MR MRCP No Cont (03.03.18 @ 11:13) >    IMPRESSION:        No evidence of bile duct dilatation nor of choledocholithiasis.  Cholelithiasis. No evidence of acute cholecystitis.

## 2018-03-04 NOTE — DISCHARGE NOTE ADULT - HOSPITAL COURSE
32F with no PMH presenting with fever x2 days with worsening rash with new onset nausea and vomiting today. Patient was seen in urgent care 2 days ago for symptoms and was prescribed Tamiflu after testing negative on UA, rapid strep, and mono. Patient states that she recently completed a course of Bactrim for UTI several days ago.    Seen by ID- monitor off abx  Rash subsided. Pt feels better

## 2018-03-04 NOTE — DISCHARGE NOTE ADULT - MEDICATION SUMMARY - MEDICATIONS TO TAKE
I will START or STAY ON the medications listed below when I get home from the hospital:    Tylenol  -- 1 tab(s) by mouth , As Needed  -- Indication: For fever as needed    Benadryl  -- 1-2 cap(s) by mouth once a day, As Needed  -- Indication: For itching as needed I will START or STAY ON the medications listed below when I get home from the hospital:    Tylenol  -- 1 tab(s) by mouth , As Needed  -- Indication: For pain

## 2018-03-04 NOTE — PROGRESS NOTE ADULT - SUBJECTIVE AND OBJECTIVE BOX
Patient is a 32y old  Female who presents with a chief complaint of Abnormal LFTs (04 Mar 2018 13:14)      SUBJECTIVE / OVERNIGHT EVENTS:  No nausea, vomiting No GI symptoms. Rash almost gone  Review of Systems:   CONSTITUTIONAL: No fever, weight loss, or fatigue  EYES: No eye pain, visual disturbances, or discharge  ENMT:  No difficulty hearing, tinnitus, vertigo; No sinus or throat pain  NECK: No pain or stiffness  BREASTS: No pain, masses, or nipple discharge  RESPIRATORY: No cough, wheezing, chills or hemoptysis; No shortness of breath  CARDIOVASCULAR: No chest pain, palpitations, dizziness, or leg swelling  GASTROINTESTINAL: No abdominal or epigastric pain. No nausea, vomiting, or hematemesis; No diarrhea or constipation. No melena or hematochezia.  GENITOURINARY: No dysuria, frequency, hematuria, or incontinence  NEUROLOGICAL: No headaches, memory loss, loss of strength, numbness, or tremors  SKIN: No itching, burning, rashes, or lesions   LYMPH NODES: No enlarged glands  ENDOCRINE: No heat or cold intolerance; No hair loss  MUSCULOSKELETAL: No joint pain or swelling; No muscle, back, or extremity pain  PSYCHIATRIC: No depression, anxiety, mood swings, or difficulty sleeping  HEME/LYMPH: No easy bruising, or bleeding gums  ALLERY AND IMMUNOLOGIC: No hives or eczema    MEDICATIONS  (STANDING):  heparin  Injectable 5000 Unit(s) SubCutaneous every 12 hours  sodium chloride 0.9%. 1000 milliLiter(s) (120 mL/Hr) IV Continuous <Continuous>    MEDICATIONS  (PRN):  acetaminophen   Tablet 650 milliGRAM(s) Oral every 6 hours PRN For Temp greater than 38 C (100.4 F)  diphenhydrAMINE   Capsule 50 milliGRAM(s) Oral every 4 hours PRN Rash and/or Itching      PHYSICAL EXAM:  Vital Signs Last 24 Hrs  T(C): 37.3 (04 Mar 2018 05:46), Max: 38.1 (03 Mar 2018 19:01)  T(F): 99.2 (04 Mar 2018 05:46), Max: 100.5 (03 Mar 2018 19:01)  HR: 75 (04 Mar 2018 05:46) (69 - 80)  BP: 105/62 (04 Mar 2018 05:46) (101/68 - 109/67)  BP(mean): --  RR: 18 (04 Mar 2018 05:46) (17 - 18)  SpO2: 97% (04 Mar 2018 05:46) (95% - 99%)  I&O's Summary    03 Mar 2018 07:01  -  04 Mar 2018 07:00  --------------------------------------------------------  IN: 2040 mL / OUT: 0 mL / NET: 2040 mL    04 Mar 2018 07:01  -  04 Mar 2018 14:00  --------------------------------------------------------  IN: 240 mL / OUT: 0 mL / NET: 240 mL      GENERAL: NAD, well-developed  HEAD:  Atraumatic, Normocephalic  EYES: EOMI, PERRLA, conjunctiva and sclera clear  NECK: Supple, No JVD  CHEST/LUNG: Clear to auscultation bilaterally; No wheeze  HEART: Regular rate and rhythm; No murmurs, rubs, or gallops  ABDOMEN: Soft, Nontender, Nondistended; Bowel sounds present  EXTREMITIES:  2+ Peripheral Pulses, No clubbing, cyanosis, or edema  PSYCH: AAOx3  NEUROLOGY: non-focal  SKIN: No rashes or lesions    LABS:  CAPILLARY BLOOD GLUCOSE                              11.7   1.40  )-----------( 156      ( 04 Mar 2018 08:29 )             34.0     03-04    136  |  103  |  7   ----------------------------<  92  3.8   |  20<L>  |  0.83    Ca    7.6<L>      04 Mar 2018 07:11    TPro  6.4  /  Alb  2.9<L>  /  TBili  0.7  /  DBili  0.3<H>  /  AST  484<H>  /  ALT  279<H>  /  AlkPhos  333<H>  03-04              RADIOLOGY & ADDITIONAL TESTS:    Imaging Personally Reviewed:    Consultant(s) Notes Reviewed:      Care Discussed with Consultants/Other Providers:

## 2018-03-04 NOTE — PROVIDER CONTACT NOTE (OTHER) - ASSESSMENT
Temp 100.5 orally
/67  HR 80  T 100  SPo2 95 on RA  Headache 8/10 pain, no blurred vision/SOB/weakness

## 2018-03-04 NOTE — PROGRESS NOTE ADULT - PROBLEM SELECTOR PLAN 1
Suspect from drug reaction. trend LFTs. [facility]iston need out pt FU for GB stones. GI eval done. Can be followed as out patient

## 2018-03-04 NOTE — DISCHARGE NOTE ADULT - CARE PROVIDER_API CALL
Gueor Salcido (JIMMY), Internal Medicine  [address] [place] [address]  Phone: [phone]  Fax: [phone]

## 2018-03-04 NOTE — PROGRESS NOTE ADULT - ASSESSMENT
32F with no PMH, recent UTI outpatient, was given some antibiotic that was not effective so her GYN gave her bactrim but on bactrim she developed fever, rash, nausea and vomiting, she stopped the bactrim and went to urgent care, RVP, strep and u/a were negative but was still given tamiflu.  Now rash is improving, no more fever or vomiting, no urinary symptoms, was found to have leukopenia  with  bandemia, RVP negative, HIV negative, EBV s/o past infection, MRCP negative, LFTs slightly better today    fever, rash, vomiting, leukopenia, deranged LFTs likely drug reaction to bactrim, already improving after discontinuing the bactrim  leukopenia worse today to 1.4, 100.5 fever    monitor off antibiotics  f/u blood cx and CMV  hem/onc eval

## 2018-03-04 NOTE — CONSULT NOTE ADULT - SUBJECTIVE AND OBJECTIVE BOX
Chief Complaint:  Patient is a 32y old  Female who presents with a chief complaint of Abnormal LFTs (04 Mar 2018 13:14)    No pertinent past medical history  No pertinent past medical history  No significant past surgical history  No significant past surgical history     HPI:  32F with no PMH presenting with fever x2 days with worsening rash with new onset nausea and vomiting today. Patient was seen in urgent care 2 days ago for symptoms and was prescribed Tamiflu after testing negative on UA, rapid strep, and mono. Patient states that she recently completed a course of Bactrim for UTI several days ago. Rash began on the same day as when Bactrim was discontinued. Nausea and vomiting began today. LMP today. Denies chills, cp, sob, sore throat, diarrhea, sick contacts, immunizations up to date.  Patient has been seen by ID in the ED (03 Mar 2018 19:48)      Bactrim (Fever; Rash)      acetaminophen   Tablet 650 milliGRAM(s) Oral every 6 hours PRN  diphenhydrAMINE   Capsule 50 milliGRAM(s) Oral every 4 hours PRN  heparin  Injectable 5000 Unit(s) SubCutaneous every 12 hours  sodium chloride 0.9%. 1000 milliLiter(s) IV Continuous <Continuous>        FAMILY HISTORY:  No pertinent family history in first degree relatives        Review of Systems:    General:  No wt loss, fevers, chills, night sweats,fatigue,   Eyes:  Good vision, no reported pain  ENT:  No sore throat, pain, runny nose, dysphagia  CV:  No pain, palpitatioins, hypo/hypertension  Resp:  No dyspnea, cough, tachypnea, wheezing  :  No pain, bleeding, incontinence, nocturia  Muscle:  No pain, weakness  Neuro:  No weakness, tingling, memory problems  Psych:  No fatigue, insomnia, mood problems, depression  Endocrine:  No polyuria, polydypsia, cold/heat intolerance  Heme:  No petechiae, ecchymosis, easy bruisability  Skin:  No rash, tattoos, scars, edema    Relevant Family History:       Relevant Social History:       Physical Exam:    Vital Signs:  Vital Signs Last 24 Hrs  T(C): 37.3 (04 Mar 2018 05:46), Max: 38.1 (03 Mar 2018 19:01)  T(F): 99.2 (04 Mar 2018 05:46), Max: 100.5 (03 Mar 2018 19:01)  HR: 75 (04 Mar 2018 05:46) (69 - 80)  BP: 105/62 (04 Mar 2018 05:46) (101/68 - 109/67)  BP(mean): --  RR: 18 (04 Mar 2018 05:46) (17 - 18)  SpO2: 97% (04 Mar 2018 05:46) (95% - 99%)  Daily     Daily     General:  Appears stated age, well-groomed, well-nourished, no distress  HEENT:  NC/AT,  conjunctivae clear and pink, no thyromegaly, nodules, adenopathy, no JVD  Chest:  Full & symmetric excursion, no increased effort, breath sounds clear  Cardiovascular:  Regular rhythm, S1, S2, no murmur/rub/S3/S4, no abdominal bruit, no edema  Abdomen:  Soft, non-tender, non-distended, normoactive bowel sounds,  no masses ,no hepatosplenomeagaly, no signs of chronic liver disease  Extremities:  no cyanosis,clubbing or edema  Skin:  No rash/erythema/ecchymoses/petechiae/wounds/abscess/warm/dry  Neuro/Psych:  Alert, oriented, no asterixis, no tremor, no encephalopathy    Laboratory:                            11.7   1.40  )-----------( 156      ( 04 Mar 2018 08:29 )             34.0     03-04    136  |  103  |  7   ----------------------------<  92  3.8   |  20<L>  |  0.83    Ca    7.6<L>      04 Mar 2018 07:11    TPro  6.4  /  Alb  2.9<L>  /  TBili  0.7  /  DBili  0.3<H>  /  AST  484<H>  /  ALT  279<H>  /  AlkPhos  333<H>  03-04    LIVER FUNCTIONS - ( 04 Mar 2018 07:11 )  Alb: 2.9 g/dL / Pro: 6.4 g/dL / ALK PHOS: 333 U/L / ALT: 279 U/L RC / AST: 484 U/L / GGT: x                 Imaging:

## 2018-03-04 NOTE — CONSULT NOTE ADULT - PROBLEM SELECTOR RECOMMENDATION 9
check hepatitis panel, marta, ama, antiLKM, ferritin, ceruloplasmin  RUQ sono, avoid hepatotoxic medication  trend daily  outpatient fu

## 2018-03-04 NOTE — DISCHARGE NOTE ADULT - CARE PLAN
Principal Discharge DX:	Rash and nonspecific skin eruption  Goal:	Free from reoccurrence of symptoms  Assessment and plan of treatment:	Condition improved  Secondary Diagnosis:	Elevated LFTs  Assessment and plan of treatment:	Improved

## 2018-03-04 NOTE — DISCHARGE NOTE ADULT - MEDICATION SUMMARY - MEDICATIONS TO STOP TAKING
I will STOP taking the medications listed below when I get home from the hospital:  None I will STOP taking the medications listed below when I get home from the hospital:    Tamiflu 75 mg oral capsule  -- 1 cap(s) by mouth 2 times a day for 5 days

## 2018-03-04 NOTE — PROVIDER CONTACT NOTE (OTHER) - SITUATION
Pt had temp of 100.5 at 1900. Day nurse gave tylenol PO. Following trough with NP to make sure aware of temperature

## 2018-03-04 NOTE — CHART NOTE - NSCHARTNOTEFT_GEN_A_CORE
Called by RN, pt with fever, first since admission. Pt with no complaints, denies any tremors, diaphoresis, cough, N/V/D, CP, dysuria.    Vital Signs Last 24 Hrs  T(C): 37.8 (04 Mar 2018 02:09), Max: 38.1 (03 Mar 2018 19:01)  T(F): 100 (04 Mar 2018 02:09), Max: 100.5 (03 Mar 2018 19:01)  HR: 80 (04 Mar 2018 02:09) (69 - 80)  BP: 109/67 (04 Mar 2018 02:09) (99/64 - 119/64)  BP(mean): --  RR: 18 (04 Mar 2018 02:09) (16 - 18)  SpO2: 95% (04 Mar 2018 02:09) (95% - 100%)  CBC Full  -  ( 03 Mar 2018 06:46 )  WBC Count : 1.6 K/uL  Hemoglobin : 13.5 g/dL  Hematocrit : 38.1 %  Platelet Count - Automated : 151 K/uL  Mean Cell Volume : 90.5 fl  Mean Cell Hemoglobin : 32.1 pg  Mean Cell Hemoglobin Concentration : 35.5 gm/dL  Auto Neutrophil # : 1.2 K/uL  Auto Lymphocyte # : 0.4 K/uL  Auto Monocyte # : 0.1 K/uL  Auto Eosinophil # : 0.0 K/uL  Auto Basophil # : 0.0 K/uL  Auto Neutrophil % : 71.0 %  Auto Lymphocyte % : 18.0 %  Auto Monocyte % : 0.0 %  Auto Eosinophil % : 0.0 %  Auto Basophil % : 0.0 %      32F with no PMH, recent UTI outpatient, treated w/ bactrim but developed fever, rash, nausea and vomiting, she stopped the bactrim and went to urgent cares she was given tamiflu, even though RVP was negative, found with elevated LFTs and leukopenia. CT scan of a/p with gall stones. ID following pt, UA with no signs of infection, kept of antibiotics and rash improving off Bactrim. However pt is now febrile, asymptomatic and hemodynamically stable.   - BC x 2 ordered  - Tylenol given for fever  - c/w IVFs  -f/u CBC in AM for present leukopenia    Teja Strickland, NP  [id number] Called by RN, pt with fever, first since admission. Pt with no complaints, denies any tremors, diaphoresis, cough, N/V/D, CP, dysuria.    Vital Signs Last 24 Hrs  T(C): 37.8 (04 Mar 2018 02:09), Max: 38.1 (03 Mar 2018 19:01)  T(F): 100 (04 Mar 2018 02:09), Max: 100.5 (03 Mar 2018 19:01)  HR: 80 (04 Mar 2018 02:09) (69 - 80)  BP: 109/67 (04 Mar 2018 02:09) (99/64 - 119/64)  BP(mean): --  RR: 18 (04 Mar 2018 02:09) (16 - 18)  SpO2: 95% (04 Mar 2018 02:09) (95% - 100%)  CBC Full  -  ( 03 Mar 2018 06:46 )  WBC Count : 1.6 K/uL  Hemoglobin : 13.5 g/dL  Hematocrit : 38.1 %  Platelet Count - Automated : 151 K/uL  Mean Cell Volume : 90.5 fl  Mean Cell Hemoglobin : 32.1 pg  Mean Cell Hemoglobin Concentration : 35.5 gm/dL  Auto Neutrophil # : 1.2 K/uL  Auto Lymphocyte # : 0.4 K/uL  Auto Monocyte # : 0.1 K/uL  Auto Eosinophil # : 0.0 K/uL  Auto Basophil # : 0.0 K/uL  Auto Neutrophil % : 71.0 %  Auto Lymphocyte % : 18.0 %  Auto Monocyte % : 0.0 %  Auto Eosinophil % : 0.0 %  Auto Basophil % : 0.0 %      32F with no PMH, recent UTI outpatient, treated w/ bactrim but developed fever, rash, nausea and vomiting, she stopped the bactrim and went to urgent cares she was given tamiflu, even though RVP was negative, found with elevated LFTs and leukopenia. CT scan of a/p with gall stones. ID following pt, UA with no signs of infection, kept of antibiotics and rash improving off Bactrim. However pt is now febrile, asymptomatic and hemodynamically stable.   - BC x 2 ordered  - Tylenol given for fever  - c/w IVFs  -f/u CBC in AM for present leukopenia  - House ID oncall Dr. Ornelas notified, low grade fever likely r/t drug rash, will consider antibiotics if pt has high fever    Teja Strickland, NAYAN  [id number]

## 2018-03-05 LAB — CMV DNA CSF QL NAA+PROBE: SIGNIFICANT CHANGE UP

## 2018-03-09 LAB
CULTURE RESULTS: SIGNIFICANT CHANGE UP
CULTURE RESULTS: SIGNIFICANT CHANGE UP
SPECIMEN SOURCE: SIGNIFICANT CHANGE UP
SPECIMEN SOURCE: SIGNIFICANT CHANGE UP

## 2018-03-22 ENCOUNTER — APPOINTMENT (OUTPATIENT)
Dept: INTERNAL MEDICINE | Facility: CLINIC | Age: 33
End: 2018-03-22
Payer: COMMERCIAL

## 2018-03-22 VITALS
HEIGHT: 65 IN | BODY MASS INDEX: 24.49 KG/M2 | TEMPERATURE: 98.5 F | HEART RATE: 77 BPM | WEIGHT: 147 LBS | DIASTOLIC BLOOD PRESSURE: 86 MMHG | SYSTOLIC BLOOD PRESSURE: 125 MMHG | OXYGEN SATURATION: 96 % | RESPIRATION RATE: 16 BRPM

## 2018-03-22 PROCEDURE — 99214 OFFICE O/P EST MOD 30 MIN: CPT

## 2018-03-27 LAB
ALBUMIN SERPL ELPH-MCNC: 4.1 G/DL
ALP BLD-CCNC: 144 U/L
ALT SERPL-CCNC: 33 U/L
AST SERPL-CCNC: 30 U/L
BASOPHILS # BLD AUTO: 0.02 K/UL
BASOPHILS NFR BLD AUTO: 0.3 %
BILIRUB DIRECT SERPL-MCNC: 0.1 MG/DL
BILIRUB INDIRECT SERPL-MCNC: 0.2 MG/DL
BILIRUB SERPL-MCNC: 0.3 MG/DL
EOSINOPHIL # BLD AUTO: 0.1 K/UL
EOSINOPHIL NFR BLD AUTO: 1.4 %
HCT VFR BLD CALC: 39.7 %
HGB BLD-MCNC: 12.8 G/DL
IMM GRANULOCYTES NFR BLD AUTO: 0.4 %
LYMPHOCYTES # BLD AUTO: 3.2 K/UL
LYMPHOCYTES NFR BLD AUTO: 45.2 %
MAN DIFF?: NORMAL
MCHC RBC-ENTMCNC: 29.6 PG
MCHC RBC-ENTMCNC: 32.2 GM/DL
MCV RBC AUTO: 91.9 FL
MONOCYTES # BLD AUTO: 0.52 K/UL
MONOCYTES NFR BLD AUTO: 7.3 %
NEUTROPHILS # BLD AUTO: 3.21 K/UL
NEUTROPHILS NFR BLD AUTO: 45.4 %
PLATELET # BLD AUTO: 434 K/UL
PROT SERPL-MCNC: 7.7 G/DL
RBC # BLD: 4.32 M/UL
RBC # FLD: 12.9 %
WBC # FLD AUTO: 7.08 K/UL

## 2018-04-19 ENCOUNTER — TRANSCRIPTION ENCOUNTER (OUTPATIENT)
Age: 33
End: 2018-04-19

## 2018-05-17 ENCOUNTER — APPOINTMENT (OUTPATIENT)
Dept: INTERNAL MEDICINE | Facility: CLINIC | Age: 33
End: 2018-05-17
Payer: COMMERCIAL

## 2018-05-17 VITALS
BODY MASS INDEX: 24.32 KG/M2 | HEART RATE: 68 BPM | OXYGEN SATURATION: 98 % | SYSTOLIC BLOOD PRESSURE: 125 MMHG | WEIGHT: 146 LBS | DIASTOLIC BLOOD PRESSURE: 82 MMHG | RESPIRATION RATE: 16 BRPM | HEIGHT: 65 IN | TEMPERATURE: 98.5 F

## 2018-05-17 PROCEDURE — 99214 OFFICE O/P EST MOD 30 MIN: CPT

## 2018-05-23 PROCEDURE — 99285 EMERGENCY DEPT VISIT HI MDM: CPT | Mod: 25

## 2018-05-23 PROCEDURE — 80053 COMPREHEN METABOLIC PANEL: CPT

## 2018-05-23 PROCEDURE — 85027 COMPLETE CBC AUTOMATED: CPT

## 2018-05-23 PROCEDURE — 86703 HIV-1/HIV-2 1 RESULT ANTBDY: CPT

## 2018-05-23 PROCEDURE — 71046 X-RAY EXAM CHEST 2 VIEWS: CPT

## 2018-05-23 PROCEDURE — 80074 ACUTE HEPATITIS PANEL: CPT

## 2018-05-23 PROCEDURE — 83690 ASSAY OF LIPASE: CPT

## 2018-05-23 PROCEDURE — 86645 CMV ANTIBODY IGM: CPT

## 2018-05-23 PROCEDURE — 86663 EPSTEIN-BARR ANTIBODY: CPT

## 2018-05-23 PROCEDURE — 87040 BLOOD CULTURE FOR BACTERIA: CPT

## 2018-05-23 PROCEDURE — 74181 MRI ABDOMEN W/O CONTRAST: CPT

## 2018-05-23 PROCEDURE — 76705 ECHO EXAM OF ABDOMEN: CPT

## 2018-05-23 PROCEDURE — 86644 CMV ANTIBODY: CPT

## 2018-05-23 PROCEDURE — 86308 HETEROPHILE ANTIBODY SCREEN: CPT

## 2018-05-23 PROCEDURE — 80076 HEPATIC FUNCTION PANEL: CPT

## 2018-05-23 PROCEDURE — 86665 EPSTEIN-BARR CAPSID VCA: CPT

## 2018-05-23 PROCEDURE — G0378: CPT

## 2018-05-23 PROCEDURE — 87633 RESP VIRUS 12-25 TARGETS: CPT

## 2018-05-23 PROCEDURE — 86664 EPSTEIN-BARR NUCLEAR ANTIGEN: CPT

## 2018-05-23 PROCEDURE — 84702 CHORIONIC GONADOTROPIN TEST: CPT

## 2018-05-23 PROCEDURE — 96374 THER/PROPH/DIAG INJ IV PUSH: CPT

## 2018-05-23 PROCEDURE — 87581 M.PNEUMON DNA AMP PROBE: CPT

## 2018-05-23 PROCEDURE — 96376 TX/PRO/DX INJ SAME DRUG ADON: CPT

## 2018-05-23 PROCEDURE — 87486 CHLMYD PNEUM DNA AMP PROBE: CPT

## 2018-05-23 PROCEDURE — 87798 DETECT AGENT NOS DNA AMP: CPT

## 2018-05-23 PROCEDURE — 80048 BASIC METABOLIC PNL TOTAL CA: CPT

## 2018-06-18 ENCOUNTER — LABORATORY RESULT (OUTPATIENT)
Age: 33
End: 2018-06-18

## 2018-06-18 ENCOUNTER — APPOINTMENT (OUTPATIENT)
Dept: INTERNAL MEDICINE | Facility: CLINIC | Age: 33
End: 2018-06-18
Payer: COMMERCIAL

## 2018-06-18 VITALS
RESPIRATION RATE: 16 BRPM | OXYGEN SATURATION: 98 % | HEIGHT: 65 IN | HEART RATE: 69 BPM | TEMPERATURE: 98.2 F | DIASTOLIC BLOOD PRESSURE: 88 MMHG | SYSTOLIC BLOOD PRESSURE: 123 MMHG | BODY MASS INDEX: 25.49 KG/M2 | WEIGHT: 153 LBS

## 2018-06-18 DIAGNOSIS — M79.606 PAIN IN LEG, UNSPECIFIED: ICD-10-CM

## 2018-06-18 PROCEDURE — 99214 OFFICE O/P EST MOD 30 MIN: CPT

## 2018-06-25 LAB
ALBUMIN SERPL ELPH-MCNC: 4.6 G/DL
ALP BLD-CCNC: 65 U/L
ALT SERPL-CCNC: 9 U/L
ANION GAP SERPL CALC-SCNC: 13 MMOL/L
AST SERPL-CCNC: 16 U/L
B BURGDOR IGG+IGM SER QL IB: NORMAL
BASOPHILS # BLD AUTO: 0.02 K/UL
BASOPHILS NFR BLD AUTO: 0.4 %
BILIRUB SERPL-MCNC: 0.2 MG/DL
BUN SERPL-MCNC: 11 MG/DL
CALCIUM SERPL-MCNC: 9.6 MG/DL
CHLORIDE SERPL-SCNC: 100 MMOL/L
CO2 SERPL-SCNC: 25 MMOL/L
CREAT SERPL-MCNC: 0.88 MG/DL
EOSINOPHIL # BLD AUTO: 0.23 K/UL
EOSINOPHIL NFR BLD AUTO: 4.3 %
GLUCOSE SERPL-MCNC: 103 MG/DL
HCT VFR BLD CALC: 43.4 %
HGB BLD-MCNC: 14.4 G/DL
IMM GRANULOCYTES NFR BLD AUTO: 0.2 %
IRON SATN MFR SERPL: 12 %
IRON SERPL-MCNC: 42 UG/DL
LYMPHOCYTES # BLD AUTO: 2 K/UL
LYMPHOCYTES NFR BLD AUTO: 37 %
MAN DIFF?: NORMAL
MCHC RBC-ENTMCNC: 29.6 PG
MCHC RBC-ENTMCNC: 33.2 GM/DL
MCV RBC AUTO: 89.3 FL
MONOCYTES # BLD AUTO: 0.27 K/UL
MONOCYTES NFR BLD AUTO: 5 %
NEUTROPHILS # BLD AUTO: 2.88 K/UL
NEUTROPHILS NFR BLD AUTO: 53.1 %
PLATELET # BLD AUTO: 388 K/UL
POTASSIUM SERPL-SCNC: 4.3 MMOL/L
PROT SERPL-MCNC: 8.3 G/DL
RBC # BLD: 4.86 M/UL
RBC # FLD: 12.8 %
SODIUM SERPL-SCNC: 138 MMOL/L
T4 FREE SERPL-MCNC: 1.5 NG/DL
TIBC SERPL-MCNC: 341 UG/DL
TSH SERPL-ACNC: 1.57 UIU/ML
UIBC SERPL-MCNC: 299 UG/DL
VIT B12 SERPL-MCNC: 347 PG/ML
WBC # FLD AUTO: 5.41 K/UL

## 2018-07-26 ENCOUNTER — RX RENEWAL (OUTPATIENT)
Age: 33
End: 2018-07-26

## 2018-07-28 PROBLEM — Z78.9 ALCOHOL USE: Status: INACTIVE | Noted: 2017-11-30

## 2018-10-02 ENCOUNTER — EMERGENCY (EMERGENCY)
Facility: HOSPITAL | Age: 33
LOS: 1 days | Discharge: HOME | End: 2018-10-02
Attending: EMERGENCY MEDICINE | Admitting: EMERGENCY MEDICINE
Payer: COMMERCIAL

## 2018-10-02 VITALS
TEMPERATURE: 98 F | RESPIRATION RATE: 18 BRPM | HEART RATE: 79 BPM | OXYGEN SATURATION: 100 % | DIASTOLIC BLOOD PRESSURE: 85 MMHG | SYSTOLIC BLOOD PRESSURE: 144 MMHG

## 2018-10-02 VITALS
OXYGEN SATURATION: 100 % | TEMPERATURE: 99 F | HEART RATE: 63 BPM | DIASTOLIC BLOOD PRESSURE: 87 MMHG | RESPIRATION RATE: 16 BRPM | SYSTOLIC BLOOD PRESSURE: 133 MMHG

## 2018-10-02 LAB
ALBUMIN SERPL ELPH-MCNC: 4.5 G/DL — SIGNIFICANT CHANGE UP (ref 3.3–5)
ALP SERPL-CCNC: 83 U/L — SIGNIFICANT CHANGE UP (ref 40–120)
ALT FLD-CCNC: 33 U/L — SIGNIFICANT CHANGE UP (ref 4–33)
AST SERPL-CCNC: 50 U/L — HIGH (ref 4–32)
BILIRUB SERPL-MCNC: 0.3 MG/DL — SIGNIFICANT CHANGE UP (ref 0.2–1.2)
BUN SERPL-MCNC: 9 MG/DL — SIGNIFICANT CHANGE UP (ref 7–23)
CALCIUM SERPL-MCNC: 9.3 MG/DL — SIGNIFICANT CHANGE UP (ref 8.4–10.5)
CHLORIDE SERPL-SCNC: 100 MMOL/L — SIGNIFICANT CHANGE UP (ref 98–107)
CO2 SERPL-SCNC: 20 MMOL/L — LOW (ref 22–31)
CREAT SERPL-MCNC: 0.76 MG/DL — SIGNIFICANT CHANGE UP (ref 0.5–1.3)
GLUCOSE SERPL-MCNC: 107 MG/DL — HIGH (ref 70–99)
HCT VFR BLD CALC: 44.6 % — SIGNIFICANT CHANGE UP (ref 34.5–45)
HGB BLD-MCNC: 14.9 G/DL — SIGNIFICANT CHANGE UP (ref 11.5–15.5)
MCHC RBC-ENTMCNC: 29.6 PG — SIGNIFICANT CHANGE UP (ref 27–34)
MCHC RBC-ENTMCNC: 33.4 % — SIGNIFICANT CHANGE UP (ref 32–36)
MCV RBC AUTO: 88.5 FL — SIGNIFICANT CHANGE UP (ref 80–100)
NRBC # FLD: 0 — SIGNIFICANT CHANGE UP
PLATELET # BLD AUTO: 389 K/UL — SIGNIFICANT CHANGE UP (ref 150–400)
PMV BLD: 9.3 FL — SIGNIFICANT CHANGE UP (ref 7–13)
POTASSIUM SERPL-MCNC: 5.5 MMOL/L — HIGH (ref 3.5–5.3)
POTASSIUM SERPL-SCNC: 5.5 MMOL/L — HIGH (ref 3.5–5.3)
PROT SERPL-MCNC: 8.4 G/DL — HIGH (ref 6–8.3)
RBC # BLD: 5.04 M/UL — SIGNIFICANT CHANGE UP (ref 3.8–5.2)
RBC # FLD: 12.7 % — SIGNIFICANT CHANGE UP (ref 10.3–14.5)
SODIUM SERPL-SCNC: 135 MMOL/L — SIGNIFICANT CHANGE UP (ref 135–145)
TROPONIN T, HIGH SENSITIVITY: < 6 NG/L — SIGNIFICANT CHANGE UP (ref ?–14)
WBC # BLD: 11.18 K/UL — HIGH (ref 3.8–10.5)
WBC # FLD AUTO: 11.18 K/UL — HIGH (ref 3.8–10.5)

## 2018-10-02 PROCEDURE — 93010 ELECTROCARDIOGRAM REPORT: CPT

## 2018-10-02 PROCEDURE — 71046 X-RAY EXAM CHEST 2 VIEWS: CPT | Mod: 26

## 2018-10-02 PROCEDURE — 99285 EMERGENCY DEPT VISIT HI MDM: CPT | Mod: 25

## 2018-10-02 PROCEDURE — 70450 CT HEAD/BRAIN W/O DYE: CPT | Mod: 26

## 2018-10-02 PROCEDURE — 70486 CT MAXILLOFACIAL W/O DYE: CPT | Mod: 26

## 2018-10-02 RX ORDER — SODIUM CHLORIDE 9 MG/ML
2000 INJECTION INTRAMUSCULAR; INTRAVENOUS; SUBCUTANEOUS ONCE
Refills: 0 | Status: COMPLETED | OUTPATIENT
Start: 2018-10-02 | End: 2018-10-02

## 2018-10-02 RX ORDER — ACETAMINOPHEN 500 MG
975 TABLET ORAL ONCE
Refills: 0 | Status: COMPLETED | OUTPATIENT
Start: 2018-10-02 | End: 2018-10-02

## 2018-10-02 RX ADMIN — Medication 975 MILLIGRAM(S): at 17:53

## 2018-10-02 RX ADMIN — SODIUM CHLORIDE 1000 MILLILITER(S): 9 INJECTION INTRAMUSCULAR; INTRAVENOUS; SUBCUTANEOUS at 17:53

## 2018-10-02 NOTE — ED PROVIDER NOTE - MUSCULOSKELETAL, MLM
Spine appears normal, range of motion is not limited, no muscle or joint tenderness Spine appears normal, range of motion is not limited, no muscle or joint tenderness. Able to walk without significant pain.

## 2018-10-02 NOTE — ED ADULT TRIAGE NOTE - CHIEF COMPLAINT QUOTE
Pt states she was running outside today and when she stopped running she felt dizzy. States last thing she remembered is waking up on the ground. Chipped 2 front upper teeth. Abrasions to left hand, left knee, upper lip and nose. Denies any pmh. FS 88 on scene. Denies any head, neck or back pain. EMS placed patient  in c-collar as precautionary.

## 2018-10-02 NOTE — ED PROVIDER NOTE - PROGRESS NOTE DETAILS
Jarrell: Less pain. Radiology results unremarkable. Lab results unremarkable. Jarrell: Less pain. Radiology results unremarkable. Lab results unremarkable. Discussed CDU option with patient, but patient feels she can get timely f/u w/ her PCP and a Cardiologist for an echo and Holter monitor.

## 2018-10-02 NOTE — ED PROVIDER NOTE - OBJECTIVE STATEMENT
Jarrell: 33 F, no PMH, today was running 2 blocks to catch up with sister and she felt dizzy and lighheaded and passed out, falling forward onto face. No pre-or post-HA/CP/SOB. No neck pain. C/o mild pain where has abrasions on L dorsal hand and L shin area.

## 2018-10-02 NOTE — ED PROVIDER NOTE - ENMT, MLM
Airway patent, abrasion upper lip. Teeth 9 and 10 chipped. Not tender. Not loose. Able to hold tongue depressor strongly in mouth. Not tender at maxilla. No neck tenderness.

## 2018-10-02 NOTE — ED ADULT NURSE NOTE - NSIMPLEMENTINTERV_GEN_ALL_ED
Implemented All Universal Safety Interventions:  [place] to call system. Call bell, personal items and telephone within reach. Instruct patient to call for assistance. Room bathroom lighting operational. Non-slip footwear when patient is off stretcher. Physically safe environment: no spills, clutter or unnecessary equipment. Stretcher in lowest position, wheels locked, appropriate side rails in place.

## 2018-10-02 NOTE — ED PROVIDER NOTE - PLAN OF CARE
Follow with primary care physician for referral to Cardiology for echocardiogram. Return if symptoms persist. Tylenol 500 mg (1 or 2 every 6 hours) and/or naproxen 500 mg (1 every 12 hours) for pain. Return if pain not controlled with oral medications. Change wound dressing daily and apply bacitracin or triple antibiotic until healed. Follow with dentist, and with primary care physician for referral to Cardiology for echocardiogram. Return if symptoms persist. Tylenol 500 mg (1 or 2 every 6 hours) and/or naproxen 500 mg (1 every 12 hours) for pain. Return if pain not controlled with oral medications. Change wound dressing daily and apply bacitracin or triple antibiotic until healed.

## 2018-10-02 NOTE — ED ADULT NURSE NOTE - CHPI ED NUR SYMPTOMS NEG
no congestion/no dizziness/no shortness of breath/no vomiting/no chills/no nausea/no diaphoresis/no fever/no back pain/no chest pain

## 2018-10-02 NOTE — ED PROVIDER NOTE - CARE PLAN
Assessment and plan of treatment:	Follow with primary care physician for referral to Cardiology for echocardiogram. Return if symptoms persist. Tylenol 500 mg (1 or 2 every 6 hours) and/or naproxen 500 mg (1 every 12 hours) for pain. Return if pain not controlled with oral medications. Change wound dressing daily and apply bacitracin or triple antibiotic until healed. Principal Discharge DX:	Syncope  Assessment and plan of treatment:	Follow with dentist, and with primary care physician for referral to Cardiology for echocardiogram. Return if symptoms persist. Tylenol 500 mg (1 or 2 every 6 hours) and/or naproxen 500 mg (1 every 12 hours) for pain. Return if pain not controlled with oral medications. Change wound dressing daily and apply bacitracin or triple antibiotic until healed.  Secondary Diagnosis:	Fall  Secondary Diagnosis:	Abrasions of multiple sites

## 2018-10-02 NOTE — ED ADULT NURSE NOTE - OBJECTIVE STATEMENT
Patient reports episode of syncope during strenuous activity. Patient currently has abrasion on left knee and left knuckles on hand. Patient denies any significant PMH. Patient denies any current CP or SOB. Patient's EKG unremarkable, patient appears NAD, VSS. Patient's labs drawn, pending results.

## 2018-10-02 NOTE — ED PROVIDER NOTE - MEDICAL DECISION MAKING DETAILS
Jarrell: Syncope. EKG w/ TWI and flat T waves inf. and lat. Check trop, labs, preg test. Give IVF. Offer CDU vs. timely outpatient echo.

## 2018-10-10 ENCOUNTER — NON-APPOINTMENT (OUTPATIENT)
Age: 33
End: 2018-10-10

## 2018-10-10 ENCOUNTER — APPOINTMENT (OUTPATIENT)
Dept: INTERNAL MEDICINE | Facility: CLINIC | Age: 33
End: 2018-10-10
Payer: COMMERCIAL

## 2018-10-10 VITALS
BODY MASS INDEX: 25.83 KG/M2 | OXYGEN SATURATION: 100 % | HEIGHT: 65 IN | TEMPERATURE: 98.9 F | WEIGHT: 155 LBS | RESPIRATION RATE: 17 BRPM | DIASTOLIC BLOOD PRESSURE: 80 MMHG | SYSTOLIC BLOOD PRESSURE: 130 MMHG | HEART RATE: 75 BPM

## 2018-10-10 PROCEDURE — 99213 OFFICE O/P EST LOW 20 MIN: CPT | Mod: 25

## 2018-10-10 PROCEDURE — 90686 IIV4 VACC NO PRSV 0.5 ML IM: CPT

## 2018-10-10 PROCEDURE — 90471 IMMUNIZATION ADMIN: CPT

## 2018-10-16 ENCOUNTER — APPOINTMENT (OUTPATIENT)
Dept: INTERNAL MEDICINE | Facility: CLINIC | Age: 33
End: 2018-10-16

## 2018-10-17 ENCOUNTER — NON-APPOINTMENT (OUTPATIENT)
Age: 33
End: 2018-10-17

## 2018-10-17 ENCOUNTER — APPOINTMENT (OUTPATIENT)
Dept: CARDIOLOGY | Facility: CLINIC | Age: 33
End: 2018-10-17
Payer: COMMERCIAL

## 2018-10-17 VITALS
WEIGHT: 156 LBS | HEART RATE: 61 BPM | RESPIRATION RATE: 17 BRPM | OXYGEN SATURATION: 97 % | SYSTOLIC BLOOD PRESSURE: 123 MMHG | TEMPERATURE: 97.9 F | HEIGHT: 65 IN | DIASTOLIC BLOOD PRESSURE: 78 MMHG | BODY MASS INDEX: 25.99 KG/M2

## 2018-10-17 DIAGNOSIS — R55 SYNCOPE AND COLLAPSE: ICD-10-CM

## 2018-10-17 PROCEDURE — 99204 OFFICE O/P NEW MOD 45 MIN: CPT

## 2018-10-17 PROCEDURE — 93000 ELECTROCARDIOGRAM COMPLETE: CPT

## 2018-11-28 ENCOUNTER — APPOINTMENT (OUTPATIENT)
Dept: CARDIOLOGY | Facility: CLINIC | Age: 33
End: 2018-11-28
Payer: COMMERCIAL

## 2018-11-28 PROCEDURE — 93306 TTE W/DOPPLER COMPLETE: CPT

## 2018-12-05 NOTE — ED CDU PROVIDER DISPOSITION NOTE - NS ED MD DISPO ADMIT [facility] UNIT
Nursing Note by Fina Rodriguez, RN at 06/29/18 05:19 PM     Author:  Fina Rodriguez, RN Service:  (none) Author Type:  Registered Nurse     Filed:  06/29/18 05:19 PM Encounter Date:  6/29/2018 Status:  Signed     :  Fina Rodriguez RN (Registered Nurse)            Labs back and pt back from radiology, placed in room 2. She is next up to be seen.[KP1.1M]       Revision History        User Key Date/Time User Provider Type Action    > KP1.1 06/29/18 05:19 PM Fina Rodriguez, RN Registered Nurse Sign    M - Manual             MED/SURG

## 2018-12-18 ENCOUNTER — APPOINTMENT (OUTPATIENT)
Dept: CARDIOLOGY | Facility: CLINIC | Age: 33
End: 2018-12-18
Payer: COMMERCIAL

## 2018-12-18 PROCEDURE — 93015 CV STRESS TEST SUPVJ I&R: CPT

## 2018-12-19 ENCOUNTER — APPOINTMENT (OUTPATIENT)
Dept: INTERNAL MEDICINE | Facility: CLINIC | Age: 33
End: 2018-12-19
Payer: COMMERCIAL

## 2018-12-19 VITALS
HEART RATE: 72 BPM | OXYGEN SATURATION: 98 % | HEIGHT: 65 IN | BODY MASS INDEX: 26.16 KG/M2 | WEIGHT: 157 LBS | TEMPERATURE: 98 F | RESPIRATION RATE: 17 BRPM | SYSTOLIC BLOOD PRESSURE: 105 MMHG | DIASTOLIC BLOOD PRESSURE: 74 MMHG

## 2018-12-19 PROCEDURE — 99395 PREV VISIT EST AGE 18-39: CPT

## 2018-12-19 RX ORDER — OMEPRAZOLE 40 MG/1
40 CAPSULE, DELAYED RELEASE ORAL DAILY
Qty: 30 | Refills: 0 | Status: DISCONTINUED | COMMUNITY
Start: 2018-05-17 | End: 2018-12-19

## 2018-12-24 LAB
25(OH)D3 SERPL-MCNC: 33.8 NG/ML
ALBUMIN SERPL ELPH-MCNC: 4.5 G/DL
ALP BLD-CCNC: 68 U/L
ALT SERPL-CCNC: 16 U/L
ANION GAP SERPL CALC-SCNC: 12 MMOL/L
AST SERPL-CCNC: 16 U/L
BASOPHILS # BLD AUTO: 0.02 K/UL
BASOPHILS NFR BLD AUTO: 0.3 %
BILIRUB SERPL-MCNC: 0.8 MG/DL
BUN SERPL-MCNC: 9 MG/DL
CALCIUM SERPL-MCNC: 9.6 MG/DL
CHLORIDE SERPL-SCNC: 104 MMOL/L
CHOLEST SERPL-MCNC: 200 MG/DL
CHOLEST/HDLC SERPL: 3.1 RATIO
CO2 SERPL-SCNC: 24 MMOL/L
CREAT SERPL-MCNC: 0.85 MG/DL
EOSINOPHIL # BLD AUTO: 0.19 K/UL
EOSINOPHIL NFR BLD AUTO: 3.1 %
GLUCOSE SERPL-MCNC: 103 MG/DL
HCT VFR BLD CALC: 43.7 %
HDLC SERPL-MCNC: 64 MG/DL
HGB BLD-MCNC: 14.3 G/DL
IMM GRANULOCYTES NFR BLD AUTO: 0.2 %
LDLC SERPL CALC-MCNC: 114 MG/DL
LYMPHOCYTES # BLD AUTO: 2.3 K/UL
LYMPHOCYTES NFR BLD AUTO: 37.4 %
MAN DIFF?: NORMAL
MCHC RBC-ENTMCNC: 30.1 PG
MCHC RBC-ENTMCNC: 32.7 GM/DL
MCV RBC AUTO: 92 FL
MONOCYTES # BLD AUTO: 0.33 K/UL
MONOCYTES NFR BLD AUTO: 5.4 %
NEUTROPHILS # BLD AUTO: 3.3 K/UL
NEUTROPHILS NFR BLD AUTO: 53.6 %
PLATELET # BLD AUTO: 384 K/UL
POTASSIUM SERPL-SCNC: 4 MMOL/L
PROT SERPL-MCNC: 7.8 G/DL
RBC # BLD: 4.75 M/UL
RBC # FLD: 12.5 %
SODIUM SERPL-SCNC: 140 MMOL/L
T4 FREE SERPL-MCNC: 1.4 NG/DL
TRIGL SERPL-MCNC: 111 MG/DL
TSH SERPL-ACNC: 1.39 UIU/ML
WBC # FLD AUTO: 6.15 K/UL

## 2019-01-08 ENCOUNTER — APPOINTMENT (OUTPATIENT)
Dept: DERMATOLOGY | Facility: CLINIC | Age: 34
End: 2019-01-08
Payer: COMMERCIAL

## 2019-01-08 VITALS — DIASTOLIC BLOOD PRESSURE: 80 MMHG | SYSTOLIC BLOOD PRESSURE: 120 MMHG

## 2019-01-08 DIAGNOSIS — Z91.89 OTHER SPECIFIED PERSONAL RISK FACTORS, NOT ELSEWHERE CLASSIFIED: ICD-10-CM

## 2019-01-08 PROCEDURE — 99203 OFFICE O/P NEW LOW 30 MIN: CPT

## 2019-01-10 NOTE — HISTORY OF PRESENT ILLNESS
[FreeTextEntry1] : np acne, rash on cheek [de-identified] : Patient is a 33 year old F presenting for\par \par 1. acne for several years on face, chest, and back. using Neutrogena acne face wash. flares around menstrual cycle. no other associated symptoms.\par 2. rash on L cheek x 1-2 weeks. itchy. used an oatmeal mask from [employer] with some improvement. no new products.

## 2019-01-10 NOTE — PHYSICAL EXAM
[Alert] : alert [Oriented x 3] : ~L oriented x 3 [Well Nourished] : well nourished [Conjunctiva Non-injected] : conjunctiva non-injected [No Visual Lymphadenopathy] : no visual  lymphadenopathy [No Clubbing] : no clubbing [No Edema] : no edema [No Bromhidrosis] : no bromhidrosis [No Chromhidrosis] : no chromhidrosis [FreeTextEntry3] : inflammatory papules and comedones on cheeks, jawline, chest, and back\par \par hyperpigmented macules and on face, chest, and back\par \par faint pink patch on L cheek\par

## 2019-05-06 ENCOUNTER — APPOINTMENT (OUTPATIENT)
Dept: GASTROENTEROLOGY | Facility: CLINIC | Age: 34
End: 2019-05-06
Payer: COMMERCIAL

## 2019-05-06 VITALS
HEART RATE: 66 BPM | OXYGEN SATURATION: 97 % | BODY MASS INDEX: 26.66 KG/M2 | DIASTOLIC BLOOD PRESSURE: 99 MMHG | WEIGHT: 160 LBS | HEIGHT: 65 IN | SYSTOLIC BLOOD PRESSURE: 148 MMHG

## 2019-05-06 DIAGNOSIS — K21.9 GASTRO-ESOPHAGEAL REFLUX DISEASE W/OUT ESOPHAGITIS: ICD-10-CM

## 2019-05-06 PROCEDURE — 99203 OFFICE O/P NEW LOW 30 MIN: CPT

## 2019-05-06 NOTE — PHYSICAL EXAM
[General Appearance - Alert] : alert [Sclera] : the sclera and conjunctiva were normal [General Appearance - In No Acute Distress] : in no acute distress [Extraocular Movements] : extraocular movements were intact [Outer Ear] : the ears and nose were normal in appearance [Hearing Threshold Finger Rub Not Lea] : hearing was normal [Neck Appearance] : the appearance of the neck was normal [Neck Cervical Mass (___cm)] : no neck mass was observed [Heart Rate And Rhythm] : heart rate was normal and rhythm regular [Auscultation Breath Sounds / Voice Sounds] : lungs were clear to auscultation bilaterally [Heart Sounds Gallop] : no gallops [Heart Sounds] : normal S1 and S2 [Bowel Sounds] : normal bowel sounds [Murmurs] : no murmurs [Heart Sounds Pericardial Friction Rub] : no pericardial rub [Abdomen Tenderness] : non-tender [Abdomen Soft] : soft [Cervical Lymph Nodes Enlarged Posterior Bilaterally] : posterior cervical [Abdomen Mass (___ Cm)] : no abdominal mass palpated [Cervical Lymph Nodes Enlarged Anterior Bilaterally] : anterior cervical [Abnormal Walk] : normal gait [Supraclavicular Lymph Nodes Enlarged Bilaterally] : supraclavicular [] : no rash [Skin Color & Pigmentation] : normal skin color and pigmentation [Nail Clubbing] : no clubbing  or cyanosis of the fingernails [Impaired Insight] : insight and judgment were intact [Affect] : the affect was normal [Oriented To Time, Place, And Person] : oriented to person, place, and time

## 2019-05-07 ENCOUNTER — TRANSCRIPTION ENCOUNTER (OUTPATIENT)
Age: 34
End: 2019-05-07

## 2019-05-08 ENCOUNTER — NON-APPOINTMENT (OUTPATIENT)
Age: 34
End: 2019-05-08

## 2019-05-08 ENCOUNTER — APPOINTMENT (OUTPATIENT)
Dept: CARDIOLOGY | Facility: CLINIC | Age: 34
End: 2019-05-08
Payer: COMMERCIAL

## 2019-05-08 VITALS
HEART RATE: 63 BPM | HEIGHT: 65 IN | OXYGEN SATURATION: 98 % | DIASTOLIC BLOOD PRESSURE: 86 MMHG | RESPIRATION RATE: 16 BRPM | SYSTOLIC BLOOD PRESSURE: 133 MMHG | TEMPERATURE: 98.9 F | BODY MASS INDEX: 27.16 KG/M2 | WEIGHT: 163 LBS

## 2019-05-08 PROBLEM — K21.9 ACID REFLUX: Status: ACTIVE | Noted: 2019-05-08

## 2019-05-08 LAB — UREA BREATH TEST QL: NEGATIVE

## 2019-05-08 PROCEDURE — 93000 ELECTROCARDIOGRAM COMPLETE: CPT

## 2019-05-08 PROCEDURE — 99214 OFFICE O/P EST MOD 30 MIN: CPT

## 2019-05-08 NOTE — HISTORY OF PRESENT ILLNESS
[de-identified] : 33-year-old woman mother of 4, who presents with chief complaint of abdominal pain.\par \par The patient reports she had a UTI and was given antibiotics approximately 6 months ago and then afterwards she started having abdominal pain. She describes her pain as a burning epigastric pain and cramps/sharp pains in her upper and mid abdomen.  The pain around her mid abdomen gets better after a bowel movement.\par \par The burning epigastric pain can last for an hour.  She feels bloated and gassy.\par \par She's taking Prilosec for 14 days for the abdominal discomfort. Her prior medical doctor told her that she possibly has gastritis.\par \par Her  is worried that her ilene may be altered by recent antibiotic use.\par \par She says she gets SOB at the time of the epigastric pain. She may have chest pains.  She may \par have palpitations.   She displays aerophagia at time of interview.\par \par She has irregular bowel habits - she may get constipated and times she may have diarrhea.  No melena no hematochezia.\par \par She takes Advil a few times during her menstrual cramps or headaches\par \par She denies any other symptoms such dysphagia, odynophagia.  She says she's been gaining weight.\par \par She denies any family history of gastrointestinal cancers.\par \par She's never had an upper endoscopy before.\par \par She lost her mother three years ago.\par \par All other review of systems are negative.  Denies cardiac symptoms.\par  \par

## 2019-05-08 NOTE — ASSESSMENT
[FreeTextEntry1] : 33-year-old woman mother of 4, who presents with chief complaint of generalized abdominal pain.\par \par Her symptoms are probably due to underlying anxiety and maybe even a panic attack. Since she takes Advil for abdominal cramps it's possible that she may even have ulcer disease.  I have therefore recommended omeprazole 40 mg once a day for 2 months. If she continues to have symptoms she will follow up with me and we will schedule for upper endoscopy.\par \par I will check her for H. pylori bacteria today.\par \par She will avoid Advil.  Tylenol ok. \par \par Followup with mental health.\par

## 2019-05-13 ENCOUNTER — NON-APPOINTMENT (OUTPATIENT)
Age: 34
End: 2019-05-13

## 2019-05-13 NOTE — HISTORY OF PRESENT ILLNESS
[FreeTextEntry1] : 33 year old woman with no pmh presents for a followup visit. \par Since her last visit with me in 10/2018 she had   an echo in 11/2018 that showed normal systolic LV function without any significant other findings, including no significant valvular disease. She had an exercise stress test unrevealing for ischemia on 12/18/18. \par \par \par recently she has noticed that she has been having more abdominal cramping which was relieved with PPI. Though she is complaining of random dyspnea that is nonexertional, random, lasting for a few minutes. She sometimes will feel an associated chest tightness with the dyspnea. \par She   denies any palpitations, PND, orthopnea, lower extremity edema,  strokelike symptoms. She has been seeing GI.

## 2019-05-13 NOTE — REVIEW OF SYSTEMS
[Negative] : Heme/Lymph [Shortness Of Breath] : shortness of breath [Chest  Pressure] : chest pressure [Heartburn] : heartburn

## 2019-05-13 NOTE — DISCUSSION/SUMMARY
[FreeTextEntry1] : 33 year woman with a history as listed presents for an initial cardiac evaluation. \par Jaylin is complaining of dyspnea and chest pressure that does not appear to be related to a cardiac etiology. She recently had an unremarkable TMET and echo. I think her symptoms are more GI related. She should get an EGD. She has nonspecific T wave changes on her EKG. She will undergo a stress echo to rule out underlying CAD and assess for structural heart disease. \par Her blood pressure and heart rate are controlled. \par Exercise and diet counseling was performed in order to reduce her future cardiovascular risk. \par She will followup with me in 3-6 months or sooner if necessary.

## 2019-05-13 NOTE — PHYSICAL EXAM
[Well Groomed] : well groomed [General Appearance - In No Acute Distress] : no acute distress [Normal Conjunctiva] : the conjunctiva exhibited no abnormalities [Eyelids - No Xanthelasma] : the eyelids demonstrated no xanthelasmas [Normal Jugular Venous A Waves Present] : normal jugular venous A waves present [No Jugular Venous Magallanes A Waves] : no jugular venous magallanes A waves [Normal Jugular Venous V Waves Present] : normal jugular venous V waves present [Exaggerated Use Of Accessory Muscles For Inspiration] : no accessory muscle use [Respiration, Rhythm And Depth] : normal respiratory rhythm and effort [Abdomen Soft] : soft [Auscultation Breath Sounds / Voice Sounds] : lungs were clear to auscultation bilaterally [Abdomen Mass (___ Cm)] : no abdominal mass palpated [Abdomen Tenderness] : non-tender [Abnormal Walk] : normal gait [Gait - Sufficient For Exercise Testing] : the gait was sufficient for exercise testing [Nail Clubbing] : no clubbing of the fingernails [Cyanosis, Localized] : no localized cyanosis [Petechial Hemorrhages (___cm)] : no petechial hemorrhages [Skin Color & Pigmentation] : normal skin color and pigmentation [] : no rash [No Venous Stasis] : no venous stasis [Skin Lesions] : no skin lesions [No Skin Ulcers] : no skin ulcer [Oriented To Time, Place, And Person] : oriented to person, place, and time [Affect] : the affect was normal [No Xanthoma] : no  xanthoma was observed [Mood] : the mood was normal [No Anxiety] : not feeling anxious [Normal Rate] : normal [Normal S1] : normal S1 [Rhythm Regular] : regular [Normal S2] : normal S2 [No Murmur] : no murmurs heard [No Gallop] : no gallop heard [2+] : Carotid: right 2+ [No Pitting Edema] : no pitting edema present [FreeTextEntry1] : dry MM [Right Carotid Bruit] : no bruit heard over the right carotid [Left Carotid Bruit] : no bruit heard over the left carotid [Bruit] : no bruit heard [Rt] : no varicose veins of the right leg [Lt] : no varicose veins of the left leg

## 2019-05-22 ENCOUNTER — TRANSCRIPTION ENCOUNTER (OUTPATIENT)
Age: 34
End: 2019-05-22

## 2019-05-23 ENCOUNTER — APPOINTMENT (OUTPATIENT)
Dept: GASTROENTEROLOGY | Facility: HOSPITAL | Age: 34
End: 2019-05-23

## 2019-05-23 ENCOUNTER — RESULT REVIEW (OUTPATIENT)
Age: 34
End: 2019-05-23

## 2019-05-23 ENCOUNTER — OUTPATIENT (OUTPATIENT)
Dept: OUTPATIENT SERVICES | Facility: HOSPITAL | Age: 34
LOS: 1 days | End: 2019-05-23
Payer: COMMERCIAL

## 2019-05-23 DIAGNOSIS — K21.9 GASTRO-ESOPHAGEAL REFLUX DISEASE WITHOUT ESOPHAGITIS: ICD-10-CM

## 2019-05-23 LAB — HCG UR QL: NEGATIVE — SIGNIFICANT CHANGE UP

## 2019-05-23 PROCEDURE — 88313 SPECIAL STAINS GROUP 2: CPT | Mod: 26

## 2019-05-23 PROCEDURE — 81025 URINE PREGNANCY TEST: CPT

## 2019-05-23 PROCEDURE — 88305 TISSUE EXAM BY PATHOLOGIST: CPT | Mod: 26

## 2019-05-23 PROCEDURE — 43239 EGD BIOPSY SINGLE/MULTIPLE: CPT

## 2019-05-23 PROCEDURE — 88312 SPECIAL STAINS GROUP 1: CPT | Mod: 26

## 2019-05-31 NOTE — ED PROVIDER NOTE - DURATION
"Subjective     Madison Carroll is a 10 year old female who presents to clinic today for the following health issues:    HPI   Acute Illness   Acute illness concerns: sore throat  Onset: today    Fever: no    Chills/Sweats: YES    Headache (location?): YES    Sinus Pressure:no    Conjunctivitis:  no    Ear Pain: YES- with swallowing    Rhinorrhea: no    Congestion: no    Sore Throat: YES     Cough: no    Wheeze: no    Decreased Appetite: YES    Nausea: YES    Vomiting: no    Diarrhea:  no    Dysuria/Freq.: no    Fatigue/Achiness: YES- \"body hurts\"    Sick/Strep Exposure: no     Therapies Tried and outcome: Ibuprofen    Had mild sore throat.  Short day of school, feeling worse as the day goes on.  No recent sick contacts.    Patient lives with parents  There are no smokers living in the home.  Patient is up-to-date with well child check-up and immunizations.           Review of Systems   Constitutional: Positive for activity change and fever.   HENT: Positive for sore throat. Negative for congestion, ear pain and rhinorrhea.    Respiratory: Negative for cough.    Musculoskeletal: Positive for myalgias.            Objective    /54 (BP Location: Right arm, Patient Position: Sitting, Cuff Size: Child)   Pulse 130   Temp 100.7  F (38.2  C) (Oral)   Resp 20   Wt 28.6 kg (63 lb)   There is no height or weight on file to calculate BMI.  Physical Exam   Constitutional: She appears listless.   HENT:   Right Ear: Tympanic membrane, external ear and canal normal.   Left Ear: Tympanic membrane, external ear and canal normal.   Mouth/Throat: Pharynx swelling and pharynx erythema present. No oropharyngeal exudate or pharynx petechiae. Tonsils are 3+ on the right. Tonsils are 2+ on the left.   Cardiovascular: Normal rate and regular rhythm.   Pulmonary/Chest: Effort normal. She has no wheezes. She has no rhonchi. She has no rales.   Neurological: She appears listless.   Nursing note and vitals reviewed.     (J02.0) " Streptococcal pharyngitis  (primary encounter diagnosis)  Comment: positive strep  Plan: amoxicillin (AMOXIL) 500 MG capsule            (J02.9) Pharyngitis, unspecified etiology  Comment:   Plan: Strep, Rapid Screen              RTC in 1w prn    Ricardo Gonzalez MD       2/day(s)

## 2019-06-19 ENCOUNTER — RESULT REVIEW (OUTPATIENT)
Age: 34
End: 2019-06-19

## 2019-12-03 ENCOUNTER — EMERGENCY (EMERGENCY)
Facility: HOSPITAL | Age: 34
LOS: 1 days | End: 2019-12-03

## 2019-12-03 VITALS
WEIGHT: 169.98 LBS | RESPIRATION RATE: 20 BRPM | TEMPERATURE: 99 F | OXYGEN SATURATION: 96 % | HEART RATE: 81 BPM | SYSTOLIC BLOOD PRESSURE: 166 MMHG | DIASTOLIC BLOOD PRESSURE: 99 MMHG | HEIGHT: 65 IN

## 2019-12-04 ENCOUNTER — OUTPATIENT (OUTPATIENT)
Dept: OUTPATIENT SERVICES | Facility: HOSPITAL | Age: 34
LOS: 1 days | End: 2019-12-04

## 2019-12-04 ENCOUNTER — APPOINTMENT (OUTPATIENT)
Dept: ULTRASOUND IMAGING | Facility: CLINIC | Age: 34
End: 2019-12-04

## 2019-12-04 DIAGNOSIS — Z00.8 ENCOUNTER FOR OTHER GENERAL EXAMINATION: ICD-10-CM

## 2019-12-10 ENCOUNTER — TRANSCRIPTION ENCOUNTER (OUTPATIENT)
Age: 34
End: 2019-12-10

## 2019-12-17 NOTE — ED PROVIDER NOTE - TEMPLATE, MLM
Number Of Freeze-Thaw Cycles: 1 freeze-thaw cycle Render Note In Bullet Format When Appropriate: No Detail Level: Detailed Post-Care Instructions: I reviewed with the patient in detail post-care instructions. Patient is to wear sunprotection, and avoid picking at any of the treated lesions. Pt may apply Vaseline to crusted or scabbing areas. Post op instructions given to patient. Duration Of Freeze Thaw-Cycle (Seconds): 0 Consent: The patient's consent was obtained including but not limited to risks of crusting, scabbing, blistering, scarring, darker or lighter pigmentary change, recurrence, incomplete removal and infection. General

## 2019-12-20 ENCOUNTER — APPOINTMENT (OUTPATIENT)
Dept: INTERNAL MEDICINE | Facility: CLINIC | Age: 34
End: 2019-12-20
Payer: COMMERCIAL

## 2019-12-20 VITALS
HEIGHT: 65 IN | DIASTOLIC BLOOD PRESSURE: 94 MMHG | SYSTOLIC BLOOD PRESSURE: 158 MMHG | OXYGEN SATURATION: 98 % | WEIGHT: 168 LBS | TEMPERATURE: 98.5 F | RESPIRATION RATE: 16 BRPM | BODY MASS INDEX: 27.99 KG/M2 | HEART RATE: 61 BPM

## 2019-12-20 DIAGNOSIS — L65.9 NONSCARRING HAIR LOSS, UNSPECIFIED: ICD-10-CM

## 2019-12-20 DIAGNOSIS — Z87.2 PERSONAL HISTORY OF DISEASES OF THE SKIN AND SUBCUTANEOUS TISSUE: ICD-10-CM

## 2019-12-20 PROCEDURE — 99395 PREV VISIT EST AGE 18-39: CPT | Mod: 25

## 2019-12-20 PROCEDURE — 90686 IIV4 VACC NO PRSV 0.5 ML IM: CPT

## 2019-12-20 PROCEDURE — G0008: CPT

## 2019-12-20 RX ORDER — OMEPRAZOLE 40 MG/1
40 CAPSULE, DELAYED RELEASE ORAL
Qty: 30 | Refills: 3 | Status: DISCONTINUED | COMMUNITY
Start: 2019-05-06 | End: 2019-12-20

## 2019-12-20 RX ORDER — MULTIVITAMIN
TABLET ORAL
Refills: 0 | Status: DISCONTINUED | COMMUNITY
End: 2019-12-20

## 2019-12-20 RX ORDER — HYDROCORTISONE 25 MG/ML
2.5 LOTION TOPICAL
Qty: 1 | Refills: 0 | Status: DISCONTINUED | COMMUNITY
Start: 2019-01-08 | End: 2019-12-20

## 2019-12-23 LAB
ALBUMIN SERPL ELPH-MCNC: 4.4 G/DL
ALP BLD-CCNC: 90 U/L
ALT SERPL-CCNC: 16 U/L
ANION GAP SERPL CALC-SCNC: 13 MMOL/L
AST SERPL-CCNC: 19 U/L
BASOPHILS # BLD AUTO: 0.03 K/UL
BASOPHILS NFR BLD AUTO: 0.4 %
BILIRUB SERPL-MCNC: 0.4 MG/DL
BUN SERPL-MCNC: 8 MG/DL
CALCIUM SERPL-MCNC: 9.6 MG/DL
CHLORIDE SERPL-SCNC: 102 MMOL/L
CHOLEST SERPL-MCNC: 186 MG/DL
CHOLEST/HDLC SERPL: 3.4 RATIO
CO2 SERPL-SCNC: 23 MMOL/L
CREAT SERPL-MCNC: 0.76 MG/DL
EOSINOPHIL # BLD AUTO: 0.25 K/UL
EOSINOPHIL NFR BLD AUTO: 3.1 %
GLUCOSE SERPL-MCNC: 111 MG/DL
HCT VFR BLD CALC: 45.7 %
HDLC SERPL-MCNC: 55 MG/DL
HGB BLD-MCNC: 14.7 G/DL
IMM GRANULOCYTES NFR BLD AUTO: 0.2 %
LDLC SERPL CALC-MCNC: 109 MG/DL
LYMPHOCYTES # BLD AUTO: 2.67 K/UL
LYMPHOCYTES NFR BLD AUTO: 33.3 %
MAN DIFF?: NORMAL
MCHC RBC-ENTMCNC: 29.2 PG
MCHC RBC-ENTMCNC: 32.2 GM/DL
MCV RBC AUTO: 90.7 FL
MONOCYTES # BLD AUTO: 0.41 K/UL
MONOCYTES NFR BLD AUTO: 5.1 %
NEUTROPHILS # BLD AUTO: 4.63 K/UL
NEUTROPHILS NFR BLD AUTO: 57.9 %
PLATELET # BLD AUTO: 496 K/UL
POTASSIUM SERPL-SCNC: 3.9 MMOL/L
PROT SERPL-MCNC: 7.9 G/DL
RBC # BLD: 5.04 M/UL
RBC # FLD: 12.6 %
SODIUM SERPL-SCNC: 138 MMOL/L
TRIGL SERPL-MCNC: 110 MG/DL
TSH SERPL-ACNC: 2.35 UIU/ML
WBC # FLD AUTO: 8.01 K/UL

## 2020-06-29 ENCOUNTER — RESULT REVIEW (OUTPATIENT)
Age: 35
End: 2020-06-29

## 2020-08-06 ENCOUNTER — ASOB RESULT (OUTPATIENT)
Age: 35
End: 2020-08-06

## 2020-08-06 ENCOUNTER — APPOINTMENT (OUTPATIENT)
Dept: ANTEPARTUM | Facility: CLINIC | Age: 35
End: 2020-08-06
Payer: COMMERCIAL

## 2020-08-06 PROCEDURE — 76801 OB US < 14 WKS SINGLE FETUS: CPT

## 2020-08-07 ENCOUNTER — APPOINTMENT (OUTPATIENT)
Dept: ANTEPARTUM | Facility: CLINIC | Age: 35
End: 2020-08-07
Payer: COMMERCIAL

## 2020-08-07 ENCOUNTER — ASOB RESULT (OUTPATIENT)
Age: 35
End: 2020-08-07

## 2020-08-07 PROCEDURE — 76815 OB US LIMITED FETUS(S): CPT

## 2020-09-18 ENCOUNTER — ASOB RESULT (OUTPATIENT)
Age: 35
End: 2020-09-18

## 2020-09-18 ENCOUNTER — APPOINTMENT (OUTPATIENT)
Dept: ANTEPARTUM | Facility: CLINIC | Age: 35
End: 2020-09-18
Payer: COMMERCIAL

## 2020-09-18 PROCEDURE — 99201 OFFICE OUTPATIENT NEW 10 MINUTES: CPT | Mod: 25

## 2020-09-18 PROCEDURE — 76811 OB US DETAILED SNGL FETUS: CPT

## 2020-12-18 ENCOUNTER — INPATIENT (INPATIENT)
Facility: HOSPITAL | Age: 35
LOS: 0 days | Discharge: HOME | End: 2020-12-19
Attending: OBSTETRICS & GYNECOLOGY | Admitting: OBSTETRICS & GYNECOLOGY

## 2020-12-18 VITALS
TEMPERATURE: 99 F | HEART RATE: 100 BPM | SYSTOLIC BLOOD PRESSURE: 119 MMHG | DIASTOLIC BLOOD PRESSURE: 67 MMHG | RESPIRATION RATE: 20 BRPM

## 2020-12-18 DIAGNOSIS — O26.899 OTHER SPECIFIED PREGNANCY RELATED CONDITIONS, UNSPECIFIED TRIMESTER: ICD-10-CM

## 2020-12-18 DIAGNOSIS — Z3A.00 WEEKS OF GESTATION OF PREGNANCY NOT SPECIFIED: ICD-10-CM

## 2020-12-18 LAB
APPEARANCE UR: CLEAR — SIGNIFICANT CHANGE UP
BACTERIA # UR AUTO: ABNORMAL
BASOPHILS # BLD AUTO: 0.03 K/UL — SIGNIFICANT CHANGE UP (ref 0–0.2)
BASOPHILS NFR BLD AUTO: 0.3 % — SIGNIFICANT CHANGE UP (ref 0–2)
BILIRUB UR-MCNC: NEGATIVE — SIGNIFICANT CHANGE UP
COLOR SPEC: SIGNIFICANT CHANGE UP
DIFF PNL FLD: NEGATIVE — SIGNIFICANT CHANGE UP
EOSINOPHIL # BLD AUTO: 0.07 K/UL — SIGNIFICANT CHANGE UP (ref 0–0.5)
EOSINOPHIL NFR BLD AUTO: 0.6 % — SIGNIFICANT CHANGE UP (ref 0–6)
EPI CELLS # UR: 3 /HPF — SIGNIFICANT CHANGE UP (ref 0–5)
FIBRINOGEN PPP-MCNC: 611 MG/DL — HIGH (ref 300–520)
GLUCOSE UR QL: ABNORMAL
HCT VFR BLD CALC: 36.5 % — SIGNIFICANT CHANGE UP (ref 34.5–45)
HGB BLD-MCNC: 12 G/DL — SIGNIFICANT CHANGE UP (ref 11.5–15.5)
HYALINE CASTS # UR AUTO: 1 /LPF — SIGNIFICANT CHANGE UP (ref 0–7)
IANC: 8.18 K/UL — SIGNIFICANT CHANGE UP (ref 1.5–8.5)
IMM GRANULOCYTES NFR BLD AUTO: 1.1 % — SIGNIFICANT CHANGE UP (ref 0–1.5)
KETONES UR-MCNC: ABNORMAL
LEUKOCYTE ESTERASE UR-ACNC: ABNORMAL
LYMPHOCYTES # BLD AUTO: 1.86 K/UL — SIGNIFICANT CHANGE UP (ref 1–3.3)
LYMPHOCYTES # BLD AUTO: 17.2 % — SIGNIFICANT CHANGE UP (ref 13–44)
MCHC RBC-ENTMCNC: 28.9 PG — SIGNIFICANT CHANGE UP (ref 27–34)
MCHC RBC-ENTMCNC: 32.9 GM/DL — SIGNIFICANT CHANGE UP (ref 32–36)
MCV RBC AUTO: 88 FL — SIGNIFICANT CHANGE UP (ref 80–100)
MONOCYTES # BLD AUTO: 0.55 K/UL — SIGNIFICANT CHANGE UP (ref 0–0.9)
MONOCYTES NFR BLD AUTO: 5.1 % — SIGNIFICANT CHANGE UP (ref 2–14)
NEUTROPHILS # BLD AUTO: 8.18 K/UL — HIGH (ref 1.8–7.4)
NEUTROPHILS NFR BLD AUTO: 75.7 % — SIGNIFICANT CHANGE UP (ref 43–77)
NITRITE UR-MCNC: NEGATIVE — SIGNIFICANT CHANGE UP
NRBC # BLD: 0 /100 WBCS — SIGNIFICANT CHANGE UP
NRBC # FLD: 0 K/UL — SIGNIFICANT CHANGE UP
PH UR: 6 — SIGNIFICANT CHANGE UP (ref 5–8)
PLATELET # BLD AUTO: 343 K/UL — SIGNIFICANT CHANGE UP (ref 150–400)
PROT UR-MCNC: ABNORMAL
RBC # BLD: 4.15 M/UL — SIGNIFICANT CHANGE UP (ref 3.8–5.2)
RBC # FLD: 12.8 % — SIGNIFICANT CHANGE UP (ref 10.3–14.5)
RBC CASTS # UR COMP ASSIST: 1 /HPF — SIGNIFICANT CHANGE UP (ref 0–4)
SP GR SPEC: 1.01 — SIGNIFICANT CHANGE UP (ref 1.01–1.02)
UROBILINOGEN FLD QL: SIGNIFICANT CHANGE UP
WBC # BLD: 10.81 K/UL — HIGH (ref 3.8–10.5)
WBC # FLD AUTO: 10.81 K/UL — HIGH (ref 3.8–10.5)
WBC UR QL: 6 /HPF — HIGH (ref 0–5)
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
## 2020-12-18 NOTE — OB RN TRIAGE NOTE - PMH
No pertinent past medical history    No pertinent past medical history    No pertinent past medical history    Spontaneous     Vaginal delivery  03 girl 5+  07 girl 7lbs  09 girl 7 +  04/114/15 girl 7-4
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
## 2020-12-18 NOTE — OB PROVIDER H&P - ASSESSMENT
D/w Dr. Wright     -Admit to L&D for observation   -Routine admission orders placed   -Covid swab sent

## 2020-12-18 NOTE — OB PROVIDER TRIAGE NOTE - NSHPPHYSICALEXAM_GEN_ALL_CORE
Vital Signs Last 24 Hrs  T(C): 37.2 (18 Dec 2020 18:13), Max: 37.2 (18 Dec 2020 18:13)  T(F): 99 (18 Dec 2020 18:13), Max: 99 (18 Dec 2020 18:13)  HR: 78 (18 Dec 2020 20:22) (78 - 100)  BP: 127/67 (18 Dec 2020 20:22) (116/63 - 131/69)  RR: 20 (18 Dec 2020 18:13) (20 - 20)    Abdomen soft, nontender   No CVA tenderness     NST x 4 hours in progress     TAUS cephalic presentation, anterior placenta, myles 12.74 cm,  bpp 8/8    TVUS CL 3.20-4.03 cm, no funneling or dynamic changes Vital Signs Last 24 Hrs  T(C): 37.2 (18 Dec 2020 18:13), Max: 37.2 (18 Dec 2020 18:13)  T(F): 99 (18 Dec 2020 18:13), Max: 99 (18 Dec 2020 18:13)  HR: 78 (18 Dec 2020 20:22) (78 - 100)  BP: 127/67 (18 Dec 2020 20:22) (116/63 - 131/69)  RR: 20 (18 Dec 2020 18:13) (20 - 20)    Abdomen soft, nontender   No CVA tenderness     NST x 4 hours Category 1 tracing, , moderate variability, + accels, no decels   contractions q 3-4 min by toco , not palpated by patient     TAUS cephalic presentation, anterior placenta, myles 12.74 cm,  bpp 8/8    TVUS CL 3.20-4.03 cm, no funneling or dynamic changes

## 2020-12-18 NOTE — OB PROVIDER H&P - NSHPPHYSICALEXAM_GEN_ALL_CORE
Vital Signs Last 24 Hrs  T(C): 37.2 (18 Dec 2020 18:13), Max: 37.2 (18 Dec 2020 18:13)  T(F): 99 (18 Dec 2020 18:13), Max: 99 (18 Dec 2020 18:13)  HR: 78 (18 Dec 2020 20:22) (78 - 100)  BP: 127/67 (18 Dec 2020 20:22) (116/63 - 131/69)  RR: 20 (18 Dec 2020 18:13) (20 - 20)    Abdomen soft, nontender   No CVA tenderness     NST x 4 hours Category 1 tracing, , moderate variability, + accels, no decels   contractions q 3-4 min by toco , not palpated by patient     TAUS cephalic presentation, anterior placenta, myles 12.74 cm,  bpp 8/8    TVUS CL 3.20-4.03 cm, no funneling or dynamic changes
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
## 2020-12-19 ENCOUNTER — TRANSCRIPTION ENCOUNTER (OUTPATIENT)
Age: 35
End: 2020-12-19

## 2020-12-19 VITALS
DIASTOLIC BLOOD PRESSURE: 76 MMHG | RESPIRATION RATE: 16 BRPM | SYSTOLIC BLOOD PRESSURE: 117 MMHG | HEART RATE: 80 BPM | TEMPERATURE: 98 F

## 2020-12-19 LAB
BLD GP AB SCN SERPL QL: NEGATIVE — SIGNIFICANT CHANGE UP
RH IG SCN BLD-IMP: POSITIVE — SIGNIFICANT CHANGE UP
RH IG SCN BLD-IMP: POSITIVE — SIGNIFICANT CHANGE UP
SARS-COV-2 IGG SERPL QL IA: NEGATIVE — SIGNIFICANT CHANGE UP
SARS-COV-2 IGM SERPL IA-ACNC: 0.06 INDEX — SIGNIFICANT CHANGE UP
SARS-COV-2 RNA SPEC QL NAA+PROBE: SIGNIFICANT CHANGE UP
T PALLIDUM AB TITR SER: NEGATIVE — SIGNIFICANT CHANGE UP

## 2020-12-19 RX ORDER — SODIUM CHLORIDE 9 MG/ML
1000 INJECTION, SOLUTION INTRAVENOUS
Refills: 0 | Status: DISCONTINUED | OUTPATIENT
Start: 2020-12-19 | End: 2020-12-19

## 2020-12-19 RX ORDER — ACETAMINOPHEN 500 MG
1 TABLET ORAL
Qty: 0 | Refills: 0 | DISCHARGE

## 2020-12-19 RX ADMIN — SODIUM CHLORIDE 125 MILLILITER(S): 9 INJECTION, SOLUTION INTRAVENOUS at 00:15

## 2020-12-19 NOTE — PROGRESS NOTE ADULT - SUBJECTIVE AND OBJECTIVE BOX
R3 Antepartum Note, HD#    Patient seen and examined at bedside, no acute overnight events. No acute complaints. Pt reports +FM, denies LOF, VB, ctx, HA, epigastric pain, blurred vision, CP, SOB, N/V, fevers, and chills.    Vital Signs Last 24 Hours  T(C): 36.7 (12-19-20 @ 01:31), Max: 37.2 (12-18-20 @ 18:13)  HR: 86 (12-19-20 @ 01:31) (78 - 100)  BP: 108/58 (12-19-20 @ 01:31) (108/58 - 131/69)  RR: 17 (12-19-20 @ 01:31) (17 - 20)  SpO2: 98% (12-19-20 @ 01:31) (98% - 98%)    CAPILLARY BLOOD GLUCOSE          Physical Exam:  General: NAD  Abdomen: Soft, non-tender, gravid  Ext: No pain or swelling    NST reactive overnight    Labs:             12.0   10.81 )-----------( 343      ( 12-18 @ 21:18 )             36.5               Uric Acid: (12-18 @ 21:18)  --       Fibrinogen: (12-18 @ 21:18)  611      LDH: (12-18 @ 21:18)  --         MEDICATIONS  (STANDING):  lactated ringers. 1000 milliLiter(s) (125 mL/Hr) IV Continuous <Continuous>    MEDICATIONS  (PRN):

## 2020-12-19 NOTE — DISCHARGE NOTE ANTEPARTUM - PATIENT PORTAL LINK FT
You can access the FollowMyHealth Patient Portal offered by [facility] by registering at the following website: [email or web address]. By joining [facility]’s FollowMyHealth portal, you will also be able to view your health information using other applications (apps) compatible with our system.

## 2020-12-19 NOTE — DISCHARGE NOTE ANTEPARTUM - CARE PROVIDER_API CALL
Jayashree Morton)  Obstetrics and Gynecology  [address] [place] [address]  Phone: [phone]  Fax: [phone]  Follow Up Time: 1 week

## 2020-12-19 NOTE — DISCHARGE NOTE ANTEPARTUM - ADDITIONAL INSTRUCTIONS
Patient advised to followup back to hospital or call doctor if any contractions, leakage of fluid, vagina bleeding, decreased fetal movement.

## 2020-12-19 NOTE — DISCHARGE NOTE ANTEPARTUM - PLAN OF CARE
recovery to baseline functional status Return to labor and delivery for assessment immediately for evaluation for any of the following: decreased fetal movement, vaginal bleeding, loss of fluid vaginally, or painful persistent contractions.
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
## 2020-12-21 ENCOUNTER — NON-APPOINTMENT (OUTPATIENT)
Age: 35
End: 2020-12-21

## 2020-12-21 ENCOUNTER — APPOINTMENT (OUTPATIENT)
Dept: INTERNAL MEDICINE | Facility: CLINIC | Age: 35
End: 2020-12-21

## 2020-12-21 ENCOUNTER — ASOB RESULT (OUTPATIENT)
Age: 35
End: 2020-12-21

## 2020-12-21 ENCOUNTER — APPOINTMENT (OUTPATIENT)
Dept: ANTEPARTUM | Facility: CLINIC | Age: 35
End: 2020-12-21
Payer: COMMERCIAL

## 2020-12-21 PROCEDURE — 99072 ADDL SUPL MATRL&STAF TM PHE: CPT

## 2020-12-21 PROCEDURE — 76819 FETAL BIOPHYS PROFIL W/O NST: CPT

## 2020-12-21 PROCEDURE — 76816 OB US FOLLOW-UP PER FETUS: CPT

## 2020-12-22 ENCOUNTER — TRANSCRIPTION ENCOUNTER (OUTPATIENT)
Age: 35
End: 2020-12-22

## 2021-01-05 ENCOUNTER — ASOB RESULT (OUTPATIENT)
Age: 36
End: 2021-01-05

## 2021-01-05 ENCOUNTER — APPOINTMENT (OUTPATIENT)
Dept: ANTEPARTUM | Facility: HOSPITAL | Age: 36
End: 2021-01-05

## 2021-01-05 ENCOUNTER — OUTPATIENT (OUTPATIENT)
Dept: INPATIENT UNIT | Facility: HOSPITAL | Age: 36
LOS: 1 days | Discharge: HOME | End: 2021-01-05

## 2021-01-05 VITALS
HEART RATE: 87 BPM | TEMPERATURE: 99 F | SYSTOLIC BLOOD PRESSURE: 138 MMHG | RESPIRATION RATE: 16 BRPM | DIASTOLIC BLOOD PRESSURE: 84 MMHG

## 2021-01-05 VITALS — SYSTOLIC BLOOD PRESSURE: 126 MMHG | DIASTOLIC BLOOD PRESSURE: 75 MMHG | HEART RATE: 86 BPM

## 2021-01-05 DIAGNOSIS — Z3A.00 WEEKS OF GESTATION OF PREGNANCY NOT SPECIFIED: ICD-10-CM

## 2021-01-05 DIAGNOSIS — O26.899 OTHER SPECIFIED PREGNANCY RELATED CONDITIONS, UNSPECIFIED TRIMESTER: ICD-10-CM

## 2021-01-05 PROBLEM — O03.9 COMPLETE OR UNSPECIFIED SPONTANEOUS ABORTION WITHOUT COMPLICATION: Chronic | Status: ACTIVE | Noted: 2020-12-18

## 2021-01-05 LAB
ALBUMIN SERPL ELPH-MCNC: 3.2 G/DL — LOW (ref 3.3–5)
ALP SERPL-CCNC: 106 U/L — SIGNIFICANT CHANGE UP (ref 40–120)
ALT FLD-CCNC: 9 U/L — SIGNIFICANT CHANGE UP (ref 4–33)
ANION GAP SERPL CALC-SCNC: 12 MMOL/L — SIGNIFICANT CHANGE UP (ref 7–14)
APPEARANCE UR: CLEAR — SIGNIFICANT CHANGE UP
APTT BLD: 24.2 SEC — LOW (ref 27–36.3)
AST SERPL-CCNC: 15 U/L — SIGNIFICANT CHANGE UP (ref 4–32)
BACTERIA # UR AUTO: ABNORMAL
BILIRUB SERPL-MCNC: <0.2 MG/DL — SIGNIFICANT CHANGE UP (ref 0.2–1.2)
BILIRUB UR-MCNC: NEGATIVE — SIGNIFICANT CHANGE UP
BLD GP AB SCN SERPL QL: NEGATIVE — SIGNIFICANT CHANGE UP
BUN SERPL-MCNC: 6 MG/DL — LOW (ref 7–23)
CALCIUM SERPL-MCNC: 9.3 MG/DL — SIGNIFICANT CHANGE UP (ref 8.4–10.5)
CHLORIDE SERPL-SCNC: 103 MMOL/L — SIGNIFICANT CHANGE UP (ref 98–107)
CO2 SERPL-SCNC: 21 MMOL/L — LOW (ref 22–31)
COLOR SPEC: SIGNIFICANT CHANGE UP
CREAT ?TM UR-MCNC: 57 MG/DL — SIGNIFICANT CHANGE UP
CREAT SERPL-MCNC: 0.68 MG/DL — SIGNIFICANT CHANGE UP (ref 0.5–1.3)
DIFF PNL FLD: NEGATIVE — SIGNIFICANT CHANGE UP
EPI CELLS # UR: 2 /HPF — SIGNIFICANT CHANGE UP (ref 0–5)
FIBRINOGEN PPP-MCNC: 641 MG/DL — HIGH (ref 290–520)
GLUCOSE SERPL-MCNC: 114 MG/DL — HIGH (ref 70–99)
GLUCOSE UR QL: NEGATIVE — SIGNIFICANT CHANGE UP
HCT VFR BLD CALC: 34.7 % — SIGNIFICANT CHANGE UP (ref 34.5–45)
HGB BLD-MCNC: 11.4 G/DL — LOW (ref 11.5–15.5)
HYALINE CASTS # UR AUTO: 3 /LPF — SIGNIFICANT CHANGE UP (ref 0–7)
INR BLD: 0.98 RATIO — SIGNIFICANT CHANGE UP (ref 0.88–1.16)
KETONES UR-MCNC: ABNORMAL
LEUKOCYTE ESTERASE UR-ACNC: ABNORMAL
MCHC RBC-ENTMCNC: 29 PG — SIGNIFICANT CHANGE UP (ref 27–34)
MCHC RBC-ENTMCNC: 32.9 GM/DL — SIGNIFICANT CHANGE UP (ref 32–36)
MCV RBC AUTO: 88.3 FL — SIGNIFICANT CHANGE UP (ref 80–100)
NITRITE UR-MCNC: NEGATIVE — SIGNIFICANT CHANGE UP
NRBC # BLD: 0 /100 WBCS — SIGNIFICANT CHANGE UP
NRBC # FLD: 0 K/UL — SIGNIFICANT CHANGE UP
PH UR: 6.5 — SIGNIFICANT CHANGE UP (ref 5–8)
PLATELET # BLD AUTO: 303 K/UL — SIGNIFICANT CHANGE UP (ref 150–400)
POTASSIUM SERPL-MCNC: 3.5 MMOL/L — SIGNIFICANT CHANGE UP (ref 3.5–5.3)
POTASSIUM SERPL-SCNC: 3.5 MMOL/L — SIGNIFICANT CHANGE UP (ref 3.5–5.3)
PROT ?TM UR-MCNC: 13 MG/DL — SIGNIFICANT CHANGE UP
PROT SERPL-MCNC: 6.8 G/DL — SIGNIFICANT CHANGE UP (ref 6–8.3)
PROT UR-MCNC: ABNORMAL
PROT/CREAT UR-RTO: 0.2 RATIO — SIGNIFICANT CHANGE UP (ref 0–0.2)
PROTHROM AB SERPL-ACNC: 11.3 SEC — SIGNIFICANT CHANGE UP (ref 10.6–13.6)
RBC # BLD: 3.93 M/UL — SIGNIFICANT CHANGE UP (ref 3.8–5.2)
RBC # FLD: 13.2 % — SIGNIFICANT CHANGE UP (ref 10.3–14.5)
RBC CASTS # UR COMP ASSIST: 1 /HPF — SIGNIFICANT CHANGE UP (ref 0–4)
RH IG SCN BLD-IMP: POSITIVE — SIGNIFICANT CHANGE UP
SODIUM SERPL-SCNC: 136 MMOL/L — SIGNIFICANT CHANGE UP (ref 135–145)
SP GR SPEC: 1.01 — SIGNIFICANT CHANGE UP (ref 1.01–1.02)
UROBILINOGEN FLD QL: SIGNIFICANT CHANGE UP
WBC # BLD: 11.74 K/UL — HIGH (ref 3.8–10.5)
WBC # FLD AUTO: 11.74 K/UL — HIGH (ref 3.8–10.5)
WBC UR QL: 21 /HPF — HIGH (ref 0–5)

## 2021-01-05 RX ORDER — SODIUM CHLORIDE 9 MG/ML
1000 INJECTION, SOLUTION INTRAVENOUS ONCE
Refills: 0 | Status: COMPLETED | OUTPATIENT
Start: 2021-01-05 | End: 2021-01-05

## 2021-01-05 RX ADMIN — SODIUM CHLORIDE 2000 MILLILITER(S): 9 INJECTION, SOLUTION INTRAVENOUS at 09:37

## 2021-01-05 NOTE — OB PROVIDER TRIAGE NOTE - NSOBPROVIDERNOTE_OBGYN_ALL_OB_FT
35 year old female P4 at 34 weeks  false labor   no evidence of PTL based on monitoring and no cx change    fetal Tachycardia resolved with IV hydration    normal labs     no acute patholgy suspected  case d/w dr dunn    discharge home with instructions    s/s of PTL reviewed    office follow up as scheduled   Jose CAMARGO

## 2021-01-05 NOTE — OB PROVIDER TRIAGE NOTE - FINDINGS/TREATMENT
case d/w dr dunn    discharge home with instructions    s/s of PTL reviewed    office follow up as scheduled resolved with IV hydration

## 2021-01-05 NOTE — OB PROVIDER TRIAGE NOTE - ADDITIONAL INSTRUCTIONS
case d/w dr dunn    discharge home with instructions    s/s of PTL reviewed    office follow up as scheduled

## 2021-01-05 NOTE — OB PROVIDER TRIAGE NOTE - NSHPLABSRESULTS_GEN_ALL_CORE
11.4   11.74 )-----------( 303      ( 2021 10:03 )             34.7       136  |  103  |  6<L>  ----------------------------<  114<H>  3.5   |  21<L>  |  0.68    Ca    9.3      2021 10:03    TPro  6.8  /  Alb  3.2<L>  /  TBili  <0.2  /  DBili  x   /  AST  15  /  ALT  9   /  AlkPhos  106  -05    Urinalysis Basic - ( 2021 10:03 )    Color: Light Yellow / Appearance: Clear / S.014 / pH: x  Gluc: x / Ketone: Trace  / Bili: Negative / Urobili: <2 mg/dL   Blood: x / Protein: Trace / Nitrite: Negative   Leuk Esterase: Large / RBC: 1 /HPF / WBC 21 /HPF   Sq Epi: x / Non Sq Epi: 2 /HPF / Bacteria: Moderate

## 2021-01-05 NOTE — OB PROVIDER TRIAGE NOTE - NSHPPHYSICALEXAM_GEN_ALL_CORE
pt seen and examined pt seen and examined  ICU Vital Signs Last 24 Hrs  T(C): 37.4 (05 Jan 2021 08:27), Max: 37.4 (05 Jan 2021 08:27)  T(F): 99.3 (05 Jan 2021 08:27), Max: 99.3 (05 Jan 2021 08:27)  HR: 86 (05 Jan 2021 11:14) (76 - 93)  BP: 126/75 (05 Jan 2021 11:14) (118/60 - 142/78)  BP(mean): --  ABP: --  ABP(mean): --  RR: 16 (05 Jan 2021 08:27) (16 - 16)  SpO2: 98% (05 Jan 2021 10:37) (95% - 100%)  pt in nAD   lungs clear   heart S1 S2   abd soft  gravid  nontender  VE 1/l/p/-3  vtx   ATU scan  by sonographer

## 2021-01-14 ENCOUNTER — OUTPATIENT (OUTPATIENT)
Dept: OUTPATIENT SERVICES | Facility: HOSPITAL | Age: 36
LOS: 1 days | Discharge: HOME | End: 2021-01-14
Payer: COMMERCIAL

## 2021-01-14 VITALS — DIASTOLIC BLOOD PRESSURE: 95 MMHG | HEART RATE: 91 BPM | SYSTOLIC BLOOD PRESSURE: 134 MMHG

## 2021-01-14 VITALS — DIASTOLIC BLOOD PRESSURE: 82 MMHG | SYSTOLIC BLOOD PRESSURE: 134 MMHG | TEMPERATURE: 98 F | HEART RATE: 88 BPM

## 2021-01-14 DIAGNOSIS — Z3A.00 WEEKS OF GESTATION OF PREGNANCY NOT SPECIFIED: ICD-10-CM

## 2021-01-14 DIAGNOSIS — O26.899 OTHER SPECIFIED PREGNANCY RELATED CONDITIONS, UNSPECIFIED TRIMESTER: ICD-10-CM

## 2021-01-14 LAB
ALBUMIN SERPL ELPH-MCNC: 3.3 G/DL — SIGNIFICANT CHANGE UP (ref 3.3–5)
ALP SERPL-CCNC: 119 U/L — SIGNIFICANT CHANGE UP (ref 40–120)
ALT FLD-CCNC: 10 U/L — SIGNIFICANT CHANGE UP (ref 4–33)
ANION GAP SERPL CALC-SCNC: 14 MMOL/L — SIGNIFICANT CHANGE UP (ref 7–14)
APPEARANCE UR: ABNORMAL
APTT BLD: 25.7 SEC — LOW (ref 27–36.3)
AST SERPL-CCNC: 20 U/L — SIGNIFICANT CHANGE UP (ref 4–32)
BACTERIA # UR AUTO: ABNORMAL
BASOPHILS # BLD AUTO: 0.02 K/UL — SIGNIFICANT CHANGE UP (ref 0–0.2)
BASOPHILS NFR BLD AUTO: 0.2 % — SIGNIFICANT CHANGE UP (ref 0–2)
BILIRUB SERPL-MCNC: 0.2 MG/DL — SIGNIFICANT CHANGE UP (ref 0.2–1.2)
BILIRUB UR-MCNC: NEGATIVE — SIGNIFICANT CHANGE UP
BUN SERPL-MCNC: 7 MG/DL — SIGNIFICANT CHANGE UP (ref 7–23)
CALCIUM SERPL-MCNC: 9.4 MG/DL — SIGNIFICANT CHANGE UP (ref 8.4–10.5)
CHLORIDE SERPL-SCNC: 100 MMOL/L — SIGNIFICANT CHANGE UP (ref 98–107)
CO2 SERPL-SCNC: 21 MMOL/L — LOW (ref 22–31)
COLOR SPEC: SIGNIFICANT CHANGE UP
CREAT ?TM UR-MCNC: 47 MG/DL — SIGNIFICANT CHANGE UP
CREAT SERPL-MCNC: 0.63 MG/DL — SIGNIFICANT CHANGE UP (ref 0.5–1.3)
DIFF PNL FLD: NEGATIVE — SIGNIFICANT CHANGE UP
EOSINOPHIL # BLD AUTO: 0.1 K/UL — SIGNIFICANT CHANGE UP (ref 0–0.5)
EOSINOPHIL NFR BLD AUTO: 1 % — SIGNIFICANT CHANGE UP (ref 0–6)
EPI CELLS # UR: 2 /HPF — SIGNIFICANT CHANGE UP (ref 0–5)
FIBRINOGEN PPP-MCNC: 608 MG/DL — HIGH (ref 290–520)
GLUCOSE SERPL-MCNC: 99 MG/DL — SIGNIFICANT CHANGE UP (ref 70–99)
GLUCOSE UR QL: NEGATIVE — SIGNIFICANT CHANGE UP
HCT VFR BLD CALC: 36.3 % — SIGNIFICANT CHANGE UP (ref 34.5–45)
HGB BLD-MCNC: 11.6 G/DL — SIGNIFICANT CHANGE UP (ref 11.5–15.5)
HYALINE CASTS # UR AUTO: 1 /LPF — SIGNIFICANT CHANGE UP (ref 0–7)
IANC: 7.24 K/UL — SIGNIFICANT CHANGE UP (ref 1.5–8.5)
IMM GRANULOCYTES NFR BLD AUTO: 1.1 % — SIGNIFICANT CHANGE UP (ref 0–1.5)
INR BLD: 0.97 RATIO — SIGNIFICANT CHANGE UP (ref 0.88–1.16)
KETONES UR-MCNC: ABNORMAL
LDH SERPL L TO P-CCNC: 163 U/L — SIGNIFICANT CHANGE UP (ref 135–225)
LEUKOCYTE ESTERASE UR-ACNC: ABNORMAL
LYMPHOCYTES # BLD AUTO: 1.96 K/UL — SIGNIFICANT CHANGE UP (ref 1–3.3)
LYMPHOCYTES # BLD AUTO: 19.6 % — SIGNIFICANT CHANGE UP (ref 13–44)
MCHC RBC-ENTMCNC: 28.8 PG — SIGNIFICANT CHANGE UP (ref 27–34)
MCHC RBC-ENTMCNC: 32 GM/DL — SIGNIFICANT CHANGE UP (ref 32–36)
MCV RBC AUTO: 90.1 FL — SIGNIFICANT CHANGE UP (ref 80–100)
MONOCYTES # BLD AUTO: 0.58 K/UL — SIGNIFICANT CHANGE UP (ref 0–0.9)
MONOCYTES NFR BLD AUTO: 5.8 % — SIGNIFICANT CHANGE UP (ref 2–14)
NEUTROPHILS # BLD AUTO: 7.24 K/UL — SIGNIFICANT CHANGE UP (ref 1.8–7.4)
NEUTROPHILS NFR BLD AUTO: 72.3 % — SIGNIFICANT CHANGE UP (ref 43–77)
NITRITE UR-MCNC: NEGATIVE — SIGNIFICANT CHANGE UP
NRBC # BLD: 0 /100 WBCS — SIGNIFICANT CHANGE UP
NRBC # FLD: 0 K/UL — SIGNIFICANT CHANGE UP
PH UR: 6 — SIGNIFICANT CHANGE UP (ref 5–8)
PLATELET # BLD AUTO: 321 K/UL — SIGNIFICANT CHANGE UP (ref 150–400)
POTASSIUM SERPL-MCNC: 3.6 MMOL/L — SIGNIFICANT CHANGE UP (ref 3.5–5.3)
POTASSIUM SERPL-SCNC: 3.6 MMOL/L — SIGNIFICANT CHANGE UP (ref 3.5–5.3)
PROT ?TM UR-MCNC: 10 MG/DL — SIGNIFICANT CHANGE UP
PROT ?TM UR-MCNC: 10 MG/DL — SIGNIFICANT CHANGE UP
PROT SERPL-MCNC: 7.1 G/DL — SIGNIFICANT CHANGE UP (ref 6–8.3)
PROT UR-MCNC: NEGATIVE — SIGNIFICANT CHANGE UP
PROT/CREAT UR-RTO: 0.2 RATIO — SIGNIFICANT CHANGE UP (ref 0–0.2)
PROTHROM AB SERPL-ACNC: 11.1 SEC — SIGNIFICANT CHANGE UP (ref 10.6–13.6)
RBC # BLD: 4.03 M/UL — SIGNIFICANT CHANGE UP (ref 3.8–5.2)
RBC # FLD: 13.4 % — SIGNIFICANT CHANGE UP (ref 10.3–14.5)
RBC CASTS # UR COMP ASSIST: 2 /HPF — SIGNIFICANT CHANGE UP (ref 0–4)
SODIUM SERPL-SCNC: 135 MMOL/L — SIGNIFICANT CHANGE UP (ref 135–145)
SP GR SPEC: 1.01 — LOW (ref 1.01–1.02)
URATE SERPL-MCNC: 5.1 MG/DL — SIGNIFICANT CHANGE UP (ref 2.5–7)
UROBILINOGEN FLD QL: SIGNIFICANT CHANGE UP
WBC # BLD: 10.01 K/UL — SIGNIFICANT CHANGE UP (ref 3.8–10.5)
WBC # FLD AUTO: 10.01 K/UL — SIGNIFICANT CHANGE UP (ref 3.8–10.5)
WBC UR QL: 4 /HPF — SIGNIFICANT CHANGE UP (ref 0–5)

## 2021-01-14 PROCEDURE — 76816 OB US FOLLOW-UP PER FETUS: CPT | Mod: 26

## 2021-01-14 PROCEDURE — 99214 OFFICE O/P EST MOD 30 MIN: CPT | Mod: 25

## 2021-01-14 PROCEDURE — 59025 FETAL NON-STRESS TEST: CPT | Mod: 26

## 2021-01-14 NOTE — OB PROVIDER TRIAGE NOTE - ADDITIONAL INSTRUCTIONS
pt discharged home - pt likely with gestational hypertension at this time per BPs from 1/5/21 in triage  pt sent home with 24 hr urine - to return to primary OB office  s/s PEC reviewed  daily kick counts reviewed  labor precautions reviewed

## 2021-01-14 NOTE — OB PROVIDER TRIAGE NOTE - NSHPLABSRESULTS_GEN_ALL_CORE
11.6   10. )-----------( 321      ( 2021 12:41 )             36.3         135  |  100  |  7   ----------------------------<  99  3.6   |  21<L>  |  0.63    Ca    9.4      2021 12:41    TPro  7.1  /  Alb  3.3  /  TBili  0.2  /  DBili  x   /  AST  20  /  ALT  10  /  AlkPhos  119  -14    PT/INR - ( 2021 12:41 )   PT: 11.1 sec;   INR: 0.97 ratio         PTT - ( 2021 12:41 )  PTT:25.7 sec    Fibrinogen 608    Urinalysis Basic - ( 2021 12:41 )    Color: Light Yellow / Appearance: Slightly Turbid / S.009 / pH: x  Gluc: x / Ketone: Small  / Bili: Negative / Urobili: <2 mg/dL   Blood: x / Protein: Negative / Nitrite: Negative   Leuk Esterase: Small / RBC: 2 /HPF / WBC 4 /HPF   Sq Epi: x / Non Sq Epi: 2 /HPF / Bacteria: Few    PCR 0.2

## 2021-01-14 NOTE — OB PROVIDER TRIAGE NOTE - NSHPPHYSICALEXAM_GEN_ALL_CORE
Lungs CTAB  Heart rate and rhythm regular  abdomen soft and nontender    Vital Signs Last 24 Hrs  T(C): 36.7 (14 Jan 2021 10:51), Max: 36.7 (14 Jan 2021 10:51)  T(F): 98.1 (14 Jan 2021 10:51), Max: 98.1 (14 Jan 2021 10:51)  HR: 85 (14 Jan 2021 10:56) (85 - 88)  BP: 128/75 (14 Jan 2021 10:56) (128/75 - 134/82)  BP(mean): --  RR: 16 (14 Jan 2021 10:51) (16 - 16)  SpO2: -- Lungs CTAB  Heart rate and rhythm regular  abdomen soft and nontender    Vital Signs Last 24 Hrs  T(C): 36.7 (14 Jan 2021 10:51), Max: 36.7 (14 Jan 2021 10:51)  T(F): 98.1 (14 Jan 2021 10:51), Max: 98.1 (14 Jan 2021 10:51)  HR: 85 (14 Jan 2021 10:56) (85 - 88)  BP: 128/75 (14 Jan 2021 10:56) (128/75 - 134/82)  BP(mean): --  RR: 16 (14 Jan 2021 10:51) (16 - 16)  SpO2: --    Cat 1 fhr tracing, fhr 155 with mod variability, accels, no decels  irregular contractions on toco    TAS - BPP 10/10, myles 11.8cm, cephalic presentation, anterior placenta

## 2021-01-14 NOTE — OB PROVIDER TRIAGE NOTE - NS_OBGYNHISTORY_OBGYN_ALL_OB_FT
Test blood sugars three times per day; before each meal and as needed for symptoms of low blood sugars.   Write blood sugars in log book.    Exercise Goals: be as active as tolerated.     Fill out 3 day food record and bring back to dietitian appointment.   Eat something every 5 hours while awake. Eat 3 meals per day.    Bring meter and log book to all diabetes education and doctor appointments.    Call Toyin Burton RN, Diabetes Educator at the [facility] with any question or concerns. [place]  [phone] or [place] [phone].  
SAb x 1  7/2003 FTSVD 5lbs  4/2007 FTSVD  7lbs  11/2009  FTSVD 7lbs  4/2015 FTSVD 7.5 lbs    Current AP significant for GDM

## 2021-01-14 NOTE — OB PROVIDER TRIAGE NOTE - NS_CHIEFCOMPLAINTOTHER_OBGYN_ALL_OB_FT
elevated BPs in the office (140/100, 152/89), pt asymptomatic. Also with decreased FM x 3 days since "I stopped all sugar and carbs." Newly diagnosed GDM, not yet classified. Denies any LOF, VB or ctx.

## 2021-01-14 NOTE — OB PROVIDER TRIAGE NOTE - NSOBPROVIDERNOTE_OBGYN_ALL_OB_FT
[facility] labs sent HELLP labs sent    1355:  d/w Dr. Morton  pt discharged home - pt likely with gestational hypertension at this time per BPs from 1/5/21 in triage  pt sent home with 24 hr urine - to return to primary OB office  s/s PEC reviewed  daily kick counts reviewed  labor precautions reviewed

## 2021-01-14 NOTE — OB PROVIDER TRIAGE NOTE - HISTORY OF PRESENT ILLNESS
35 y.o.  @ 35.5 weeks sent from the office to day with 2 elevated BPs 0f 140/100 and 152/89, Pt denies HA, ruq pain, vision changes, n/v. Pt reports less fetal movement x 3 days. Pt reports she was recently diagnosed with GDM and has appointment with diabetic counselor next Wednesday. Pt reports decrease in fetal movement since she cut back on her carb consumption. Pt reports intermittent contractions at night. Denies vb or lof.

## 2021-01-16 LAB
COLLECT DURATION TIME UR: 24 HR — SIGNIFICANT CHANGE UP
PROT 24H UR-MRATE: 218 MG/24 H — HIGH
TOTAL VOLUME - 24 HOUR: 1675 ML — SIGNIFICANT CHANGE UP
URINE CREATININE CALCULATION: 1.1 G/24 H — SIGNIFICANT CHANGE UP (ref 0.6–1.6)
URINE CREATININE CALCULATION: 1.1 G/24 H — SIGNIFICANT CHANGE UP (ref 0.6–1.6)

## 2021-01-20 ENCOUNTER — INPATIENT (INPATIENT)
Facility: HOSPITAL | Age: 36
LOS: 4 days | Discharge: HOME | End: 2021-01-25
Attending: OBSTETRICS & GYNECOLOGY | Admitting: OBSTETRICS & GYNECOLOGY
Payer: COMMERCIAL

## 2021-01-20 ENCOUNTER — ASOB RESULT (OUTPATIENT)
Age: 36
End: 2021-01-20

## 2021-01-20 ENCOUNTER — APPOINTMENT (OUTPATIENT)
Dept: MATERNAL FETAL MEDICINE | Facility: CLINIC | Age: 36
End: 2021-01-20
Payer: COMMERCIAL

## 2021-01-20 VITALS
TEMPERATURE: 99 F | RESPIRATION RATE: 17 BRPM | HEART RATE: 93 BPM | SYSTOLIC BLOOD PRESSURE: 140 MMHG | DIASTOLIC BLOOD PRESSURE: 87 MMHG

## 2021-01-20 DIAGNOSIS — O26.899 OTHER SPECIFIED PREGNANCY RELATED CONDITIONS, UNSPECIFIED TRIMESTER: ICD-10-CM

## 2021-01-20 DIAGNOSIS — Z3A.00 WEEKS OF GESTATION OF PREGNANCY NOT SPECIFIED: ICD-10-CM

## 2021-01-20 DIAGNOSIS — O99.810 ABNORMAL GLUCOSE COMPLICATING PREGNANCY: ICD-10-CM

## 2021-01-20 DIAGNOSIS — Z36.9 ENCOUNTER FOR ANTENATAL SCREENING, UNSPECIFIED: ICD-10-CM

## 2021-01-20 LAB
ALBUMIN SERPL ELPH-MCNC: 3.3 G/DL — SIGNIFICANT CHANGE UP (ref 3.3–5)
ALP SERPL-CCNC: 131 U/L — HIGH (ref 40–120)
ALT FLD-CCNC: 9 U/L — SIGNIFICANT CHANGE UP (ref 4–33)
ANION GAP SERPL CALC-SCNC: 14 MMOL/L — SIGNIFICANT CHANGE UP (ref 7–14)
APPEARANCE UR: ABNORMAL
APTT BLD: 26.2 SEC — LOW (ref 27–36.3)
AST SERPL-CCNC: 16 U/L — SIGNIFICANT CHANGE UP (ref 4–32)
BACTERIA # UR AUTO: ABNORMAL
BASOPHILS # BLD AUTO: 0.03 K/UL — SIGNIFICANT CHANGE UP (ref 0–0.2)
BASOPHILS NFR BLD AUTO: 0.3 % — SIGNIFICANT CHANGE UP (ref 0–2)
BILIRUB SERPL-MCNC: 0.3 MG/DL — SIGNIFICANT CHANGE UP (ref 0.2–1.2)
BILIRUB UR-MCNC: NEGATIVE — SIGNIFICANT CHANGE UP
BUN SERPL-MCNC: 7 MG/DL — SIGNIFICANT CHANGE UP (ref 7–23)
CALCIUM SERPL-MCNC: 9.3 MG/DL — SIGNIFICANT CHANGE UP (ref 8.4–10.5)
CHLORIDE SERPL-SCNC: 101 MMOL/L — SIGNIFICANT CHANGE UP (ref 98–107)
CO2 SERPL-SCNC: 20 MMOL/L — LOW (ref 22–31)
COLOR SPEC: YELLOW — SIGNIFICANT CHANGE UP
CREAT ?TM UR-MCNC: 161 MG/DL — SIGNIFICANT CHANGE UP
CREAT SERPL-MCNC: 0.7 MG/DL — SIGNIFICANT CHANGE UP (ref 0.5–1.3)
DIFF PNL FLD: NEGATIVE — SIGNIFICANT CHANGE UP
EOSINOPHIL # BLD AUTO: 0.08 K/UL — SIGNIFICANT CHANGE UP (ref 0–0.5)
EOSINOPHIL NFR BLD AUTO: 0.8 % — SIGNIFICANT CHANGE UP (ref 0–6)
EPI CELLS # UR: SIGNIFICANT CHANGE UP
FIBRINOGEN PPP-MCNC: 568 MG/DL — HIGH (ref 290–520)
GLUCOSE SERPL-MCNC: 84 MG/DL — SIGNIFICANT CHANGE UP (ref 70–99)
GLUCOSE UR QL: NEGATIVE — SIGNIFICANT CHANGE UP
HCT VFR BLD CALC: 36.6 % — SIGNIFICANT CHANGE UP (ref 34.5–45)
HGB BLD-MCNC: 11.5 G/DL — SIGNIFICANT CHANGE UP (ref 11.5–15.5)
IANC: 7.44 K/UL — SIGNIFICANT CHANGE UP (ref 1.5–8.5)
IMM GRANULOCYTES NFR BLD AUTO: 0.9 % — SIGNIFICANT CHANGE UP (ref 0–1.5)
INR BLD: 0.99 RATIO — SIGNIFICANT CHANGE UP (ref 0.88–1.16)
KETONES UR-MCNC: ABNORMAL
LDH SERPL L TO P-CCNC: 151 U/L — SIGNIFICANT CHANGE UP (ref 135–225)
LEUKOCYTE ESTERASE UR-ACNC: ABNORMAL
LYMPHOCYTES # BLD AUTO: 1.75 K/UL — SIGNIFICANT CHANGE UP (ref 1–3.3)
LYMPHOCYTES # BLD AUTO: 17.5 % — SIGNIFICANT CHANGE UP (ref 13–44)
MCHC RBC-ENTMCNC: 28.3 PG — SIGNIFICANT CHANGE UP (ref 27–34)
MCHC RBC-ENTMCNC: 31.4 GM/DL — LOW (ref 32–36)
MCV RBC AUTO: 90.1 FL — SIGNIFICANT CHANGE UP (ref 80–100)
MONOCYTES # BLD AUTO: 0.61 K/UL — SIGNIFICANT CHANGE UP (ref 0–0.9)
MONOCYTES NFR BLD AUTO: 6.1 % — SIGNIFICANT CHANGE UP (ref 2–14)
NEUTROPHILS # BLD AUTO: 7.44 K/UL — HIGH (ref 1.8–7.4)
NEUTROPHILS NFR BLD AUTO: 74.4 % — SIGNIFICANT CHANGE UP (ref 43–77)
NITRITE UR-MCNC: POSITIVE
NRBC # BLD: 0 /100 WBCS — SIGNIFICANT CHANGE UP
NRBC # FLD: 0 K/UL — SIGNIFICANT CHANGE UP
PH UR: 6 — SIGNIFICANT CHANGE UP (ref 5–8)
PLATELET # BLD AUTO: 350 K/UL — SIGNIFICANT CHANGE UP (ref 150–400)
POTASSIUM SERPL-MCNC: 3.9 MMOL/L — SIGNIFICANT CHANGE UP (ref 3.5–5.3)
POTASSIUM SERPL-SCNC: 3.9 MMOL/L — SIGNIFICANT CHANGE UP (ref 3.5–5.3)
PROT ?TM UR-MCNC: 48 MG/DL — SIGNIFICANT CHANGE UP
PROT ?TM UR-MCNC: 48 MG/DL — SIGNIFICANT CHANGE UP
PROT SERPL-MCNC: 7 G/DL — SIGNIFICANT CHANGE UP (ref 6–8.3)
PROT UR-MCNC: ABNORMAL
PROT/CREAT UR-RTO: 0.3 RATIO — HIGH (ref 0–0.2)
PROTHROM AB SERPL-ACNC: 11.3 SEC — SIGNIFICANT CHANGE UP (ref 10.6–13.6)
RBC # BLD: 4.06 M/UL — SIGNIFICANT CHANGE UP (ref 3.8–5.2)
RBC # FLD: 13.8 % — SIGNIFICANT CHANGE UP (ref 10.3–14.5)
RBC CASTS # UR COMP ASSIST: SIGNIFICANT CHANGE UP /HPF (ref 0–4)
SODIUM SERPL-SCNC: 135 MMOL/L — SIGNIFICANT CHANGE UP (ref 135–145)
SP GR SPEC: 1.02 — SIGNIFICANT CHANGE UP (ref 1.01–1.02)
URATE SERPL-MCNC: 6 MG/DL — SIGNIFICANT CHANGE UP (ref 2.5–7)
UROBILINOGEN FLD QL: SIGNIFICANT CHANGE UP
WBC # BLD: 10 K/UL — SIGNIFICANT CHANGE UP (ref 3.8–10.5)
WBC # FLD AUTO: 10 K/UL — SIGNIFICANT CHANGE UP (ref 3.8–10.5)
WBC UR QL: >50 /HPF — SIGNIFICANT CHANGE UP (ref 0–5)

## 2021-01-20 PROCEDURE — G0108 DIAB MANAGE TRN  PER INDIV: CPT | Mod: 95

## 2021-01-20 RX ORDER — SODIUM CHLORIDE 9 MG/ML
1000 INJECTION, SOLUTION INTRAVENOUS
Refills: 0 | Status: DISCONTINUED | OUTPATIENT
Start: 2021-01-20 | End: 2021-01-20

## 2021-01-20 RX ORDER — CEFAZOLIN SODIUM 1 G
1000 VIAL (EA) INJECTION EVERY 8 HOURS
Refills: 0 | Status: DISCONTINUED | OUTPATIENT
Start: 2021-01-20 | End: 2021-01-21

## 2021-01-20 RX ORDER — CEFAZOLIN SODIUM 1 G
2000 VIAL (EA) INJECTION ONCE
Refills: 0 | Status: COMPLETED | OUTPATIENT
Start: 2021-01-20 | End: 2021-01-20

## 2021-01-20 RX ADMIN — Medication 100 MILLIGRAM(S): at 19:37

## 2021-01-20 NOTE — OB PROVIDER H&P - NSHPPHYSICALEXAM_GEN_ALL_CORE
Gen: NAD; A+O x 3  Cardiac: S1/S2, R/R/R  Pulm: CTAB, unlabored breathing  Abdomen: Gravid, soft, non-tender  DTRs 2+  VS-BP: 126/75, P 90, RR 18, T 37.3, Pain 0/10  BP trends: 126/75, 128/74, 119/75, 115/71, 119/82, 123/86, 128/89, 121/84, 123/85, 125/78, 125/83, 136/93, 131/89, 132/83, 127/86, 134/86, 132/93, 112/63, 134/87, 122/84  Cat 1 tracin bpm, moderate variability, +accels, no decels  [place]: Irregular/painless  Limited TAS: SLIUP, Cephalic, Anterior placenta, BPP 8/8, KELSEY 18.36cm, EFW 2730g  Plan:  HELLP Labs

## 2021-01-20 NOTE — OB PROVIDER H&P - NS_PRENATALRECORD_OBGYN_ALL_OB
1 week PO: Patient is doing well post-operatively. The importance of post-op drop compliance was emphasized. Drop scheduled reviewed with patient. Patient to call if any visual changes or concerns. No

## 2021-01-20 NOTE — OB PROVIDER TRIAGE NOTE - HISTORY OF PRESENT ILLNESS
Patient of Dr Morton  36 y/o  female, para 4014 @ 36+4 wks gestation, single IUP.  Referred from MDs office for further evaluation due to elevated blood pressures in this office 155/95, 153/100. Pt asymptomatic, denies headache, dizziness, visual changes/scotoma, N+V, RUQ/Epigastric pain. Pt with history of elevated BPs, completed 24 hr urine on 21 with 218mg protein.   Pt endorses strong fetal movement, denies any leakage of fluid, vaginal bleeding or contractions.    A/P Complications: Gestational hypertension. Increased risk for T21 on nuchal, NIPT Low risk. Newly diagnosed GDM, not yet classified-had appt today, has not been monitoring FS.   Blood Transfusion: Patient will accept blood    Covid Assessment: Pt denies any: fever, chills, cough, SOB or any recent known exposure to Covid-19.    Allergies: Bactrim (Rash)  Meds: PNV  PMH: Denies  PSH: Denies  OBgynHx    2003-Uncomplicated  @ 39 wks. Female, 5#5  2007-Uncomplicated  @ 40 wks. Female, 7#5  2009-Uncomplicated  @ 40 wks. Female, 7#8  2015-Uncomplicated  @ 39 wks. Female, 7#  2019-Complete SAB @ 6 wks  Pt denies any h/o: Abn. PAP, ovarian cysts, uterine fibroids, breast problems, STDs/STIs  PSY: Denies  EtOH/ Smoke/ Recreational substance use: denies  FH: Denies  H/W/BMI: 65"/195#/         Patient of Dr Morton  36 y/o  female, para 4014 @ 36+4 wks gestation, single IUP.  Referred from MDs office for further evaluation due to elevated blood pressures in this office 155/95, 153/100. Pt asymptomatic, denies headache, dizziness, visual changes/scotoma, N+V, RUQ/Epigastric pain. Pt with history of elevated BPs, completed 24 hr urine on 21 with 218mg protein.   Pt endorses strong fetal movement, denies any leakage of fluid, vaginal bleeding or contractions.    A/P Complications: Gestational hypertension. Increased risk for T21 on nuchal, NIPT Low risk. Newly diagnosed GDM, not yet classified-had appt today, has not been monitoring FS.   Blood Transfusion: Patient will accept blood    Covid Assessment: Pt denies any: fever, chills, cough, SOB or any recent known exposure to Covid-19.    Allergies: Bactrim, Sulfa drugs (Rash)  Meds: PNV  PMH: Denies  PSH: Denies  OBgynHx    2003-Uncomplicated  @ 39 wks. Female, 5#5  2007-Uncomplicated  @ 40 wks. Female, 7#5  2009-Uncomplicated  @ 40 wks. Female, 7#8  2015-Uncomplicated  @ 39 wks. Female, 7#  2019-Complete SAB @ 6 wks  Pt denies any h/o: Abn. PAP, ovarian cysts, uterine fibroids, breast problems, STDs/STIs  PSY: Denies  EtOH/ Smoke/ Recreational substance use: denies  FH: Denies  H/W/BMI: 65"/195#/

## 2021-01-20 NOTE — OB PROVIDER H&P - NS_ADMITDT_OBGYN_ALL_OB_DT
CHIEF COMPLAINT(S):   Chief Complaint   Patient presents with   • Right Thumb - Pain       HISTORY OF PRESENT ILLNESS:  Samantha Quinn is a 76 year old right-handed female who presents today for an initial evaluation of her right thumb pain that began yesterday, 12/27/2018.  She notes that she was carrying a bag down the stairs and tripped on the bag.  She notes she tried to break her fall and hit her right thumb and her head.  She notes she took a few Tylenol and did not go to the [facility] due to having company over that day.  She was seen by the [facility] today where she acquired x-rays a thumb splint.  She rates her pain as a 8/10, but reduced to a 5/10 after taking Tylenol.  She describes the pain as achy in nature.  She denies any numbness or tingling.  She notes pain over the metacarpal phalengeal joint in her right thumb.    Accompanied by  and Alejandra  Location:right  1st finger   Date of Onset: 12/27/2018  Context: see above  Severity:8/10   Timing: intermittent  Quality: aching  Associated signs and symptoms: none  Aggravating factors: movement  Alleviating factors: brace  Retired    Scribed By: MARIE Ruiz    REVIEW OF SYSTEMS:  Skin: No problems with hair or nails. No rash. No new skin lesions.  Heent: Pt denies problems with head, eyes, ears, nose, mouth, throat and neck  Respiratory: No coughing, wheezing, changes in voice, nor shortness of breath  Cardiovascular: No chest pain, palpitations or other cardiac complaints noted  Gastrointestinal: No diarrhea, constipation, abdominal pain or other complaints noted  Genitourinary: Pt denies urinary pain, bleeding and voiding problems  Musculoskeletal: See HPI  Neurologic: Pt denies syncope, seizures, paralysis, involuntary movements or gait problems  Psychiatric: Pt denies sleep, anxiety, depression, sexual and other psychiatric problems  Hematologic/Lymphatic/Immunologic: Pt denies hematological, lymphatic and immunological  problems  Endocrine: Pt denies thyroid and diabetic problems    Past Medical History Updated: Yes  PAST MEDICAL HISTORY:  No past medical history on file.    Past Surgical History Updated: Yes  PAST SURGICAL HISTORY:  No past surgical history on file.    Past Family History Updated: Yes  FAMILY HISTORY:  No family history on file.  Reviewed and non-contributory to patient's illness unless otherwise stated above    Past Social History Updated: Yes  SOCIAL HISTORY:  Social History     Socioeconomic History   • Marital status: Not on file     Spouse name: Not on file   • Number of children: Not on file   • Years of education: Not on file   • Highest education level: Not on file   Social Needs   • Financial resource strain: Not on file   • Food insecurity - worry: Not on file   • Food insecurity - inability: Not on file   • Transportation needs - medical: Not on file   • Transportation needs - non-medical: Not on file   Occupational History   • Not on file   Tobacco Use   • Smoking status: Not on file   Substance and Sexual Activity   • Alcohol use: Not on file   • Drug use: Not on file   • Sexual activity: Not on file   Other Topics Concern   • Not on file   Social History Narrative   • Not on file        MEDICATIONS:  Current Outpatient Medications   Medication Sig Dispense Refill   • omeprazole (PRILOSEC) 20 MG capsule Take 20 mg by mouth daily.     • AMLODIPINE BESYLATE PO      • acetaminophen-codeine (TYLENOL NO.3) 300-30 MG per tablet Take 1 tablet by mouth every 6 hours as needed for Pain. 20 tablet 0     No current facility-administered medications for this visit.      ALLERGIES:   ALLERGIES:  No Known Allergies    VITAL SIGNS:  Visit Vitals  /72   Pulse 76   Resp 16   Ht 5' 3\" (1.6 m)   Wt 56.2 kg (124 lb)   BMI 21.97 kg/m²     Body mass index is 21.97 kg/m².    EXAM:  This is a 76 year old female, awake, alert, and cooperative. She is well nourished, well developed, and in no apparent  distress.  Pulmonary - Respiratory rate within normal limits. No increased work of breathing.  Skin:  Head, neck, and extremity skin is intact without rashes or lesions.    Inspection  Fingernails are normal, pink in color. No dryness noted. Normal temperature bilaterally. No lesions present on examination. No scars present on examination.     Right Palpation Exam  Pain and swelling with ecchymosis along the MCP joint of the thumb. Some laxity with UCL testing.  otherwise no tenderness or swelling appreciated.      Left Palpation Exam  No tenderness or swelling noted on examination.      Right Wrist ROM  Wrist motion is normal.     Left Wrist ROM  Wrist motion is normal.     Right Thumb MCP ROM abnormal.  Left Thumb MCP ROM normal.    Right Thumb IP ROM normal.  Left Thumb IP ROM normal.    Right Strength Exam  Wrist strength is 5/5 without pain  Index finger strength is 5/5 without pain  Long finger strength is 5/5 without pain  Ring finger strength is 5/5 without pain  Small finger strength is 5/5 without pain  Thumb - APB strength is 4/5 with pain- limited due to pain.  Thumb - FPL strength is 4/5 with pain- limited due to pain.   Pinch mechanism is normal.     Right Sensory Exam - Normal    Left Strength Exam  Wrist strength is 5/5 without pain  Index finger strength is 5/5 without pain  Long finger strength is 5/5 without pain  Ring finger strength is 5/5 without pain  Small finger strength is 5/5 without pain  Thumb - APB strength is 5/5 without pain  Thumb - FPL strength is 5/5 without pain  Pinch mechanism is normal.     Left Sensory Exam - Normal    Right Vascular Exam  Capillary fill is normal.   Edema is absent  Radial artery refill is normal.   Ulnar artery refill is normal.     Left Vascular Exam  Capillary fill is normal.   Edema is absent  Radial artery refill is normal.   Ulnar artery refill is normal.     Lymphatics - There is no evidence of generalized adenopathy.    IMAGING &TEST:  Imaging studies  (x-rays) were reviewed. These show:  FINDINGS: There is a nondisplaced intra-articular chip fracture involving the base of the proximal phalanx.  There is moderate loss of joint space involving the interphalangeal joint compatible with degenerative change.  There is mild degenerative change involving the 1st carpometacarpal joint.    ASSESSMENT & PLAN:  Saamntha Quinn is a 76 year old female with nondisplaced fracture at the base of the thumb proximal phalanx and UCL sprain. We discussed the injury and overall treatment. I recommend a [employer] thumb spica splint. We will see her back in 3 weeks for repeat imaging. She can remove for icing and bathing. Acetaminophen up to 1 gram three times daily for pain.     Restrictions: limited use of the right hand. Keep splint on at all times besides icing and bathing.     The patient will follow up with us in 3 weeks, repeat imaging.     Electronically Signed by:    Augustus Kim DO 12/28/18   20-Jan-2021 20:00

## 2021-01-20 NOTE — OB PROVIDER TRIAGE NOTE - NSHPLABSRESULTS_GEN_ALL_CORE
11.5   10.00 )-----------( 350      ( 20 Jan 2021 16:23 )             36.6 11.5   10.00 )-----------( 350      ( 2021 16:23 )             36.6        135  |  101  |  7   ----------------------------<  84  3.9   |  20<L>  |  0.70    Ca    9.3      2021 16:23    TPro  7.0  /  Alb  3.3  /  TBili  0.3  /  DBili  x   /  AST  16  /  ALT  9   /  AlkPhos  131<H>      PT/INR - ( 2021 16:23 )   PT: 11.3 sec;   INR: 0.99 ratio         PTT - ( 2021 16:23 )  PTT:26.2 sec    Fibrinogen Assay: 568 mg/dL (21 @ 16:23)     Urinalysis Basic - ( 2021 16:23 )    Color: Yellow / Appearance: Slightly Turbid / S.021 / pH: x  Gluc: x / Ketone: Moderate  / Bili: Negative / Urobili: <2 mg/dL   Blood: x / Protein: 30 mg/dL / Nitrite: Positive   Leuk Esterase: Large / RBC: 3-5 /HPF / WBC >50 /HPF   Sq Epi: x / Non Sq Epi: Few / Bacteria: Many    Total Protein, Random Urine (21 @ 16:23)   Total Protein, Random Urine: 48: Reference range not established for this test mg/dL     `Protein/Creatinine Ratio, Urine (21 @ 16:23)   Creatinine, Random Urine: 161: Reference Range:   Normal= 15-30 mg/kg Body Weight/Day mg/dL   Total Protein, Random Urine: 48: Reference range not established for this test mg/dL   Protein/Creatinine Ratio Calculation: 0.3 Ratio

## 2021-01-20 NOTE — OB PROVIDER H&P - NSHPLABSRESULTS_GEN_ALL_CORE
11.5   10.00 )-----------( 350      ( 2021 16:23 )             36.6        135  |  101  |  7   ----------------------------<  84  3.9   |  20<L>  |  0.70    Ca    9.3      2021 16:23    TPro  7.0  /  Alb  3.3  /  TBili  0.3  /  DBili  x   /  AST  16  /  ALT  9   /  AlkPhos  131<H>      PT/INR - ( 2021 16:23 )   PT: 11.3 sec;   INR: 0.99 ratio         PTT - ( 2021 16:23 )  PTT:26.2 sec    Fibrinogen Assay: 568 mg/dL (21 @ 16:23)     Urinalysis Basic - ( 2021 16:23 )    Color: Yellow / Appearance: Slightly Turbid / S.021 / pH: x  Gluc: x / Ketone: Moderate  / Bili: Negative / Urobili: <2 mg/dL   Blood: x / Protein: 30 mg/dL / Nitrite: Positive   Leuk Esterase: Large / RBC: 3-5 /HPF / WBC >50 /HPF   Sq Epi: x / Non Sq Epi: Few / Bacteria: Many    Total Protein, Random Urine (21 @ 16:23)   Total Protein, Random Urine: 48: Reference range not established for this test mg/dL     `Protein/Creatinine Ratio, Urine (21 @ 16:23)   Creatinine, Random Urine: 161: Reference Range:   Normal= 15-30 mg/kg Body Weight/Day mg/dL   Total Protein, Random Urine: 48: Reference range not established for this test mg/dL   Protein/Creatinine Ratio Calculation: 0.3 Ratio

## 2021-01-20 NOTE — OB PROVIDER H&P - PROBLEM SELECTOR PLAN 1
Admit to L&D  -BP monitoring  -For Ancef  -24 urine urine collection  -Repeat HELLP Labs in the AM  -NST BID

## 2021-01-20 NOTE — OB PROVIDER H&P - ASSESSMENT
Patient of Dr Morton  36 y/o  female, para 4014 @ 36+4 wks gestation, single IUP.  Referred from MDs office for further evaluation due to elevated blood pressures in this office 155/95, 153/100. Pt asymptomatic, denies headache, dizziness, visual changes/scotoma, N+V, RUQ/Epigastric pain. Pt with history of elevated BPs, completed 24 hr urine on 1/16/21 with 218mg protein.   Pt endorses strong fetal movement, denies any leakage of fluid, vaginal bleeding or contractions.    A/P Complications: Gestational hypertension. Increased risk for T21 on nuchal, NIPT Low risk. Newly diagnosed GDM, not yet classified-had appt today, has not been monitoring FS.   Blood Transfusion: Patient will accept blood    Covid Assessment: Pt denies any: fever, chills, cough, SOB or any recent known exposure to Covid-19.    Labs resulted above. P/C ratio 0.3. Suspected UTI    Dr Morton notified of above findings  Admit to L&D  -BP monitoring  -For Ancef  -24 urine urine collection  -Repeat HELLP Labs in the AM  -NST BID

## 2021-01-20 NOTE — OB RN TRIAGE NOTE - CHIEF COMPLAINT QUOTE
sent from MD office with elevated BP  *denies headache, visual changes, SOB, chest/epigastric, dizziness, vomiting, swelling* sent from MD office with elevated BP  *denies headache, visual changes, SOB, chest/epigastric, dizziness, vomiting, swelling*  *scheduled induction 1/28*

## 2021-01-20 NOTE — OB PROVIDER H&P - HISTORY OF PRESENT ILLNESS
Patient of Dr Morton  36 y/o  female, para 4014 @ 36+4 wks gestation, single IUP.  Referred from MDs office for further evaluation due to elevated blood pressures in this office 155/95, 153/100. Pt asymptomatic, denies headache, dizziness, visual changes/scotoma, N+V, RUQ/Epigastric pain. Pt with history of elevated BPs, completed 24 hr urine on 21 with 218mg protein.   Pt endorses strong fetal movement, denies any leakage of fluid, vaginal bleeding or contractions.    A/P Complications: Gestational hypertension. Increased risk for T21 on nuchal, NIPT Low risk. Newly diagnosed GDM, not yet classified-had appt today, has not been monitoring FS.   Blood Transfusion: Patient will accept blood    Covid Assessment: Pt denies any: fever, chills, cough, SOB or any recent known exposure to Covid-19.    Allergies: Bactrim, Sulfa drugs (Rash)  Meds: PNV  PMH: Denies  PSH: Denies  OBgynHx    2003-Uncomplicated  @ 39 wks. Female, 5#5  2007-Uncomplicated  @ 40 wks. Female, 7#5  2009-Uncomplicated  @ 40 wks. Female, 7#8  2015-Uncomplicated  @ 39 wks. Female, 7#  2019-Complete SAB @ 6 wks  Pt denies any h/o: Abn. PAP, ovarian cysts, uterine fibroids, breast problems, STDs/STIs  PSY: Denies  EtOH/ Smoke/ Recreational substance use: denies  FH: Denies  H/W/BMI: 65"/195#/         Patient of Dr Morton  34 y/o female, para 4014 @ 36+4 wks gestation, single IUP.  Referred from MDs office for further evaluation due to elevated blood pressures in this office 155/95, 153/100. Pt asymptomatic, denies headache, dizziness, visual changes/scotoma, N+V, RUQ/Epigastric pain. Pt with history of elevated BPs, completed 24 hr urine on 21 with 218mg protein.   Pt endorses strong fetal movement, denies any leakage of fluid, vaginal bleeding or contractions.    A/P Complications: Gestational hypertension. Increased risk for T21 on nuchal, NIPT Low risk. Newly diagnosed GDM, not yet classified-had appt today, has not been monitoring FS.   Blood Transfusion: Patient will accept blood    Covid Assessment: Pt denies any: fever, chills, cough, SOB or any recent known exposure to Covid-19.    Allergies: Bactrim, Sulfa drugs (Rash)  Meds: PNV  PMH: Denies  PSH: Denies  OBgynHx    2003-Uncomplicated  @ 39 wks. Female, 5#5  2007-Uncomplicated  @ 40 wks. Female, 7#5  2009-Uncomplicated  @ 40 wks. Female, 7#8  2015-Uncomplicated  @ 39 wks. Female, 7#  2019-Complete SAB @ 6 wks  Pt denies any h/o: Abn. PAP, ovarian cysts, uterine fibroids, breast problems, STDs/STIs  PSY: Denies  EtOH/ Smoke/ Recreational substance use: denies  FH: Denies  H/W/BMI: 65"/195#/

## 2021-01-20 NOTE — OB PROVIDER TRIAGE NOTE - NSOBPROVIDERNOTE_OBGYN_ALL_OB_FT
Labs resulted above. P/C ratio 0.3. Suspected UTI Labs resulted above. P/C ratio 0.3. Suspected UTI  Dr Morton notified of above findings  Admit to L&D  -BP monitoring  -For Ancef  -24 urine urine collection  -Repeat HELLP Labs in the AM  -NST BID Patient of Dr Morton  36 y/o  female, para 4014 @ 36+4 wks gestation, single IUP.  Referred from MDs office for further evaluation due to elevated blood pressures in this office 155/95, 153/100. Pt asymptomatic, denies headache, dizziness, visual changes/scotoma, N+V, RUQ/Epigastric pain. Pt with history of elevated BPs, completed 24 hr urine on 21 with 218mg protein.   Pt endorses strong fetal movement, denies any leakage of fluid, vaginal bleeding or contractions.    A/P Complications: Gestational hypertension. Increased risk for T21 on nuchal, NIPT Low risk. Newly diagnosed GDM, not yet classified-had appt today, has not been monitoring FS.   Blood Transfusion: Patient will accept blood    Covid Assessment: Pt denies any: fever, chills, cough, SOB or any recent known exposure to Covid-19.    Allergies: Bactrim, Sulfa drugs (Rash)  Meds: PNV  PMH: Denies  PSH: Denies  OBgynHx    2003-Uncomplicated  @ 39 wks. Female, 5#5  2007-Uncomplicated  @ 40 wks. Female, 7#5  2009-Uncomplicated  @ 40 wks. Female, 7#8  2015-Uncomplicated  @ 39 wks. Female, 7#  2019-Complete SAB @ 6 wks  Pt denies any h/o: Abn. PAP, ovarian cysts, uterine fibroids, breast problems, STDs/STIs  PSY: Denies  EtOH/ Smoke/ Recreational substance use: denies  FH: Denies  H/W/BMI: 65"/195#/    Labs resulted above. P/C ratio 0.3. Suspected UTI  Dr Morton notified of above findings  Admit to L&D  -BP monitoring  -For Ancef  -24 urine urine collection  -Repeat HELLP Labs in the AM  -NST BID

## 2021-01-20 NOTE — OB PROVIDER TRIAGE NOTE - NS_OBGYNHISTORY_OBGYN_ALL_OB_FT
2003-Uncomplicated  @ 39 wks. Female, 5#5  2007-Uncomplicated  @ 40 wks. Female, 7#5  2009-Uncomplicated  @ 40 wks. Female, 7#8  2015-Uncomplicated  @ 39 wks. Female, 7#  2019-Complete SAB @ 6 wks  Pt denies any h/o: Abn. PAP, ovarian cysts, uterine fibroids, breast problems, STDs/STIs

## 2021-01-20 NOTE — OB PROVIDER TRIAGE NOTE - NSHPPHYSICALEXAM_GEN_ALL_CORE
Gen: NAD; A+O x 3  Cardiac: S1/S2, R/R/R  Pulm: CTAB, unlabored breathing  Abdomen: Gravid, soft, non-tender  DTRs 2+  VS-BP: 126/75, P 90, RR 18, T 37.3, Pain 0/10  BP trends: 126/75, 128/74, 119/75, 115/71, 119/82, 123/86, 128/89, 121/84, 123/85, 125/78, 125/83   Cat 1 tracin bpm, moderate variability, +accels, no decels  [place]: Irregular/painless Gen: NAD; A+O x 3  Cardiac: S1/S2, R/R/R  Pulm: CTAB, unlabored breathing  Abdomen: Gravid, soft, non-tender  DTRs 2+  VS-BP: 126/75, P 90, RR 18, T 37.3, Pain 0/10  BP trends: 126/75, 128/74, 119/75, 115/71, 119/82, 123/86, 128/89, 121/84, 123/85, 125/78, 125/83, 136/93, 131/89, 132/83, 127/86, 134/86, 132/93, 112/63, 134/87, 122/84  Cat 1 tracin bpm, moderate variability, +accels, no decels  [place]: Irregular/painless  Plan:  HELLP Labs Gen: NAD; A+O x 3  Cardiac: S1/S2, R/R/R  Pulm: CTAB, unlabored breathing  Abdomen: Gravid, soft, non-tender  DTRs 2+  VS-BP: 126/75, P 90, RR 18, T 37.3, Pain 0/10  BP trends: 126/75, 128/74, 119/75, 115/71, 119/82, 123/86, 128/89, 121/84, 123/85, 125/78, 125/83, 136/93, 131/89, 132/83, 127/86, 134/86, 132/93, 112/63, 134/87, 122/84  Cat 1 tracin bpm, moderate variability, +accels, no decels  [place]: Irregular/painless  Limited TAS: SLIUP, Cephalic, Anterior placenta, BPP 8/8, KELSEY 18.36cm, EFW 2730g  Plan:  HELLP Labs

## 2021-01-21 ENCOUNTER — TRANSCRIPTION ENCOUNTER (OUTPATIENT)
Age: 36
End: 2021-01-21

## 2021-01-21 LAB
ALBUMIN SERPL ELPH-MCNC: 3 G/DL — LOW (ref 3.3–5)
ALP SERPL-CCNC: 123 U/L — HIGH (ref 40–120)
ALT FLD-CCNC: 7 U/L — SIGNIFICANT CHANGE UP (ref 4–33)
ANION GAP SERPL CALC-SCNC: 16 MMOL/L — HIGH (ref 7–14)
APTT BLD: 25.8 SEC — LOW (ref 27–36.3)
APTT BLD: 27 SEC — SIGNIFICANT CHANGE UP (ref 27–36.3)
AST SERPL-CCNC: 18 U/L — SIGNIFICANT CHANGE UP (ref 4–32)
BASOPHILS # BLD AUTO: 0.03 K/UL — SIGNIFICANT CHANGE UP (ref 0–0.2)
BASOPHILS # BLD AUTO: 0.03 K/UL — SIGNIFICANT CHANGE UP (ref 0–0.2)
BASOPHILS NFR BLD AUTO: 0.3 % — SIGNIFICANT CHANGE UP (ref 0–2)
BASOPHILS NFR BLD AUTO: 0.3 % — SIGNIFICANT CHANGE UP (ref 0–2)
BILIRUB SERPL-MCNC: 0.4 MG/DL — SIGNIFICANT CHANGE UP (ref 0.2–1.2)
BLD GP AB SCN SERPL QL: NEGATIVE — SIGNIFICANT CHANGE UP
BUN SERPL-MCNC: 6 MG/DL — LOW (ref 7–23)
CALCIUM SERPL-MCNC: 9.3 MG/DL — SIGNIFICANT CHANGE UP (ref 8.4–10.5)
CHLORIDE SERPL-SCNC: 101 MMOL/L — SIGNIFICANT CHANGE UP (ref 98–107)
CO2 SERPL-SCNC: 18 MMOL/L — LOW (ref 22–31)
CREAT ?TM UR-MCNC: 142 MG/DL — SIGNIFICANT CHANGE UP
CREAT SERPL-MCNC: 0.66 MG/DL — SIGNIFICANT CHANGE UP (ref 0.5–1.3)
EOSINOPHIL # BLD AUTO: 0.07 K/UL — SIGNIFICANT CHANGE UP (ref 0–0.5)
EOSINOPHIL # BLD AUTO: 0.08 K/UL — SIGNIFICANT CHANGE UP (ref 0–0.5)
EOSINOPHIL NFR BLD AUTO: 0.7 % — SIGNIFICANT CHANGE UP (ref 0–6)
EOSINOPHIL NFR BLD AUTO: 0.9 % — SIGNIFICANT CHANGE UP (ref 0–6)
FIBRINOGEN PPP-MCNC: 554 MG/DL — HIGH (ref 290–520)
FIBRINOGEN PPP-MCNC: 579 MG/DL — HIGH (ref 290–520)
GLUCOSE BLDC GLUCOMTR-MCNC: 100 MG/DL — HIGH (ref 70–99)
GLUCOSE BLDC GLUCOMTR-MCNC: 109 MG/DL — HIGH (ref 70–99)
GLUCOSE BLDC GLUCOMTR-MCNC: 83 MG/DL — SIGNIFICANT CHANGE UP (ref 70–99)
GLUCOSE BLDC GLUCOMTR-MCNC: 84 MG/DL — SIGNIFICANT CHANGE UP (ref 70–99)
GLUCOSE BLDC GLUCOMTR-MCNC: 96 MG/DL — SIGNIFICANT CHANGE UP (ref 70–99)
GLUCOSE SERPL-MCNC: 87 MG/DL — SIGNIFICANT CHANGE UP (ref 70–99)
HCT VFR BLD CALC: 36 % — SIGNIFICANT CHANGE UP (ref 34.5–45)
HCT VFR BLD CALC: 36.5 % — SIGNIFICANT CHANGE UP (ref 34.5–45)
HGB BLD-MCNC: 11.5 G/DL — SIGNIFICANT CHANGE UP (ref 11.5–15.5)
HGB BLD-MCNC: 11.9 G/DL — SIGNIFICANT CHANGE UP (ref 11.5–15.5)
IANC: 6.2 K/UL — SIGNIFICANT CHANGE UP (ref 1.5–8.5)
IANC: 6.98 K/UL — SIGNIFICANT CHANGE UP (ref 1.5–8.5)
IMM GRANULOCYTES NFR BLD AUTO: 0.6 % — SIGNIFICANT CHANGE UP (ref 0–1.5)
IMM GRANULOCYTES NFR BLD AUTO: 0.9 % — SIGNIFICANT CHANGE UP (ref 0–1.5)
INR BLD: 1.02 RATIO — SIGNIFICANT CHANGE UP (ref 0.88–1.16)
INR BLD: 1.04 RATIO — SIGNIFICANT CHANGE UP (ref 0.88–1.16)
LDH SERPL L TO P-CCNC: 146 U/L — SIGNIFICANT CHANGE UP (ref 135–225)
LYMPHOCYTES # BLD AUTO: 2.04 K/UL — SIGNIFICANT CHANGE UP (ref 1–3.3)
LYMPHOCYTES # BLD AUTO: 2.2 K/UL — SIGNIFICANT CHANGE UP (ref 1–3.3)
LYMPHOCYTES # BLD AUTO: 20.9 % — SIGNIFICANT CHANGE UP (ref 13–44)
LYMPHOCYTES # BLD AUTO: 24.2 % — SIGNIFICANT CHANGE UP (ref 13–44)
MCHC RBC-ENTMCNC: 28.7 PG — SIGNIFICANT CHANGE UP (ref 27–34)
MCHC RBC-ENTMCNC: 29.3 PG — SIGNIFICANT CHANGE UP (ref 27–34)
MCHC RBC-ENTMCNC: 31.5 GM/DL — LOW (ref 32–36)
MCHC RBC-ENTMCNC: 33.1 GM/DL — SIGNIFICANT CHANGE UP (ref 32–36)
MCV RBC AUTO: 88.7 FL — SIGNIFICANT CHANGE UP (ref 80–100)
MCV RBC AUTO: 91 FL — SIGNIFICANT CHANGE UP (ref 80–100)
MONOCYTES # BLD AUTO: 0.51 K/UL — SIGNIFICANT CHANGE UP (ref 0–0.9)
MONOCYTES # BLD AUTO: 0.59 K/UL — SIGNIFICANT CHANGE UP (ref 0–0.9)
MONOCYTES NFR BLD AUTO: 5.6 % — SIGNIFICANT CHANGE UP (ref 2–14)
MONOCYTES NFR BLD AUTO: 6 % — SIGNIFICANT CHANGE UP (ref 2–14)
NEUTROPHILS # BLD AUTO: 6.2 K/UL — SIGNIFICANT CHANGE UP (ref 1.8–7.4)
NEUTROPHILS # BLD AUTO: 6.98 K/UL — SIGNIFICANT CHANGE UP (ref 1.8–7.4)
NEUTROPHILS NFR BLD AUTO: 68.1 % — SIGNIFICANT CHANGE UP (ref 43–77)
NEUTROPHILS NFR BLD AUTO: 71.5 % — SIGNIFICANT CHANGE UP (ref 43–77)
NRBC # BLD: 0 /100 WBCS — SIGNIFICANT CHANGE UP
NRBC # BLD: 0 /100 WBCS — SIGNIFICANT CHANGE UP
NRBC # FLD: 0 K/UL — SIGNIFICANT CHANGE UP
NRBC # FLD: 0 K/UL — SIGNIFICANT CHANGE UP
PLATELET # BLD AUTO: 310 K/UL — SIGNIFICANT CHANGE UP (ref 150–400)
PLATELET # BLD AUTO: 330 K/UL — SIGNIFICANT CHANGE UP (ref 150–400)
POTASSIUM SERPL-MCNC: 3.9 MMOL/L — SIGNIFICANT CHANGE UP (ref 3.5–5.3)
POTASSIUM SERPL-SCNC: 3.9 MMOL/L — SIGNIFICANT CHANGE UP (ref 3.5–5.3)
PROT ?TM UR-MCNC: 35 MG/DL — SIGNIFICANT CHANGE UP
PROT ?TM UR-MCNC: 35 MG/DL — SIGNIFICANT CHANGE UP
PROT SERPL-MCNC: 6.5 G/DL — SIGNIFICANT CHANGE UP (ref 6–8.3)
PROT/CREAT UR-RTO: 0.2 RATIO — SIGNIFICANT CHANGE UP (ref 0–0.2)
PROTHROM AB SERPL-ACNC: 11.6 SEC — SIGNIFICANT CHANGE UP (ref 10.6–13.6)
PROTHROM AB SERPL-ACNC: 11.8 SEC — SIGNIFICANT CHANGE UP (ref 10.6–13.6)
RBC # BLD: 4.01 M/UL — SIGNIFICANT CHANGE UP (ref 3.8–5.2)
RBC # BLD: 4.06 M/UL — SIGNIFICANT CHANGE UP (ref 3.8–5.2)
RBC # FLD: 13.8 % — SIGNIFICANT CHANGE UP (ref 10.3–14.5)
RBC # FLD: 14.1 % — SIGNIFICANT CHANGE UP (ref 10.3–14.5)
RH IG SCN BLD-IMP: POSITIVE — SIGNIFICANT CHANGE UP
SARS-COV-2 RNA SPEC QL NAA+PROBE: SIGNIFICANT CHANGE UP
SODIUM SERPL-SCNC: 135 MMOL/L — SIGNIFICANT CHANGE UP (ref 135–145)
T PALLIDUM AB TITR SER: NEGATIVE — SIGNIFICANT CHANGE UP
URATE SERPL-MCNC: 6.6 MG/DL — SIGNIFICANT CHANGE UP (ref 2.5–7)
WBC # BLD: 9.1 K/UL — SIGNIFICANT CHANGE UP (ref 3.8–10.5)
WBC # BLD: 9.77 K/UL — SIGNIFICANT CHANGE UP (ref 3.8–10.5)
WBC # FLD AUTO: 9.1 K/UL — SIGNIFICANT CHANGE UP (ref 3.8–10.5)
WBC # FLD AUTO: 9.77 K/UL — SIGNIFICANT CHANGE UP (ref 3.8–10.5)

## 2021-01-21 RX ORDER — OXYTOCIN 10 UNIT/ML
2 VIAL (ML) INJECTION
Qty: 30 | Refills: 0 | Status: DISCONTINUED | OUTPATIENT
Start: 2021-01-21 | End: 2021-01-22

## 2021-01-21 RX ORDER — SODIUM CHLORIDE 9 MG/ML
1000 INJECTION INTRAMUSCULAR; INTRAVENOUS; SUBCUTANEOUS
Refills: 0 | Status: DISCONTINUED | OUTPATIENT
Start: 2021-01-21 | End: 2021-01-22

## 2021-01-21 RX ORDER — OXYTOCIN 10 UNIT/ML
333.33 VIAL (ML) INJECTION
Qty: 20 | Refills: 0 | Status: COMPLETED | OUTPATIENT
Start: 2021-01-21 | End: 2021-01-21

## 2021-01-21 RX ORDER — SODIUM CHLORIDE 9 MG/ML
1000 INJECTION, SOLUTION INTRAVENOUS
Refills: 0 | Status: DISCONTINUED | OUTPATIENT
Start: 2021-01-21 | End: 2021-01-22

## 2021-01-21 RX ADMIN — Medication 100 MILLIGRAM(S): at 14:26

## 2021-01-21 RX ADMIN — SODIUM CHLORIDE 125 MILLILITER(S): 9 INJECTION, SOLUTION INTRAVENOUS at 14:20

## 2021-01-21 RX ADMIN — SODIUM CHLORIDE 125 MILLILITER(S): 9 INJECTION INTRAMUSCULAR; INTRAVENOUS; SUBCUTANEOUS at 20:03

## 2021-01-21 RX ADMIN — Medication 2 MILLIUNIT(S)/MIN: at 20:04

## 2021-01-21 RX ADMIN — Medication 100 MILLIGRAM(S): at 05:53

## 2021-01-21 NOTE — CHART NOTE - NSCHARTNOTEFT_GEN_A_CORE
Pt is a 34yo  @ 36.5wks presenting w/ UTI and labile BP with dx of gHTN; per MFM patient may start induction of labor today. Pt denies HA, visual changes, RUQ pain, contx, VB, LOF. +FM    PE:  ICU Vital Signs Last 24 Hrs  T(C): 37.2 (2021 10:05), Max: 37.3 (2021 13:44)  T(F): 98.9 (2021 10:05), Max: 99.14 (2021 13:55)  HR: 88 (2021 10:05) (70 - 95)  BP: 143/77 (2021 10:05) (112/63 - 143/77)  BP(mean): --  ABP: --  ABP(mean): --  RR: 17 (2021 10:05) (17 - 18)  SpO2: 97% (2021 10:05) (96% - 98%)  Abd: gravid soft NT  EFM: 150 moderate variability + acels no decels  [place]: none  VE:/-3 by Dr Praveen Castillo: Vtx presentation                              11.5   9.10  )-----------( 330      ( 2021 06:29 )             36.5       -    135  |  101  |  6<L>  ----------------------------<  87  3.9   |  18<L>  |  0.66    Ca    9.3      2021 06:29    TPro  6.5  /  Alb  3.0<L>  /  TBili  0.4  /  DBili  x   /  AST  18  /  ALT  7   /  AlkPhos  123<H>                Urinalysis Basic - ( 2021 16:23 )    Color: Yellow / Appearance: Slightly Turbid / S.021 / pH: x  Gluc: x / Ketone: Moderate  / Bili: Negative / Urobili: <2 mg/dL   Blood: x / Protein: 30 mg/dL / Nitrite: Positive   Leuk Esterase: Large / RBC: 3-5 /HPF / WBC >50 /HPF   Sq Epi: x / Non Sq Epi: Few / Bacteria: Many        PT/INR - ( 2021 06:29 )   PT: 11.6 sec;   INR: 1.02 ratio         PTT - ( 2021 06:29 )  PTT:27.0 sec    Lactate Trend            CAPILLARY BLOOD GLUCOSE      POCT Blood Glucose.: 84 mg/dL (2021 12:12)            34yo  @ 36.5wks presenting w/ UTI and labile BP with dx of gHTN; per MFM patient may start induction of labor today  -transfer to L&D for IOL with vaginal cytotec then pitocin  - BP monitoring  - HELLP labs WNL  - glucose monitoring  -Continue Ancef IV      d/w Dr. Praveen Martinez [facility] Pt is a 34yo  @ 36.5wks presenting w/ UTI and labile BP with dx of gHTN; per MFM patient may start induction of labor today. Pt denies HA, visual changes, RUQ pain, contx, VB, LOF. +FM    PE:  ICU Vital Signs Last 24 Hrs  T(C): 37.2 (2021 10:05), Max: 37.3 (2021 13:44)  T(F): 98.9 (2021 10:05), Max: 99.14 (2021 13:55)  HR: 88 (2021 10:05) (70 - 95)  BP: 143/77 (2021 10:05) (112/63 - 143/77)  BP(mean): --  ABP: --  ABP(mean): --  RR: 17 (2021 10:05) (17 - 18)  SpO2: 97% (2021 10:05) (96% - 98%)  Abd: gravid soft NT  EFM: 150 moderate variability + accels no decels  [place]: none  VE:/-3 by Dr Praveen Castillo: Vtx presentation                              11.5   9.10  )-----------( 330      ( 2021 06:29 )             36.5       -    135  |  101  |  6<L>  ----------------------------<  87  3.9   |  18<L>  |  0.66    Ca    9.3      2021 06:29    TPro  6.5  /  Alb  3.0<L>  /  TBili  0.4  /  DBili  x   /  AST  18  /  ALT  7   /  AlkPhos  123<H>                Urinalysis Basic - ( 2021 16:23 )    Color: Yellow / Appearance: Slightly Turbid / S.021 / pH: x  Gluc: x / Ketone: Moderate  / Bili: Negative / Urobili: <2 mg/dL   Blood: x / Protein: 30 mg/dL / Nitrite: Positive   Leuk Esterase: Large / RBC: 3-5 /HPF / WBC >50 /HPF   Sq Epi: x / Non Sq Epi: Few / Bacteria: Many        PT/INR - ( 2021 06:29 )   PT: 11.6 sec;   INR: 1.02 ratio         PTT - ( 2021 06:29 )  PTT:27.0 sec    Lactate Trend            CAPILLARY BLOOD GLUCOSE      POCT Blood Glucose.: 84 mg/dL (2021 12:12)            34yo  @ 36.5wks presenting w/ UTI and labile BP with dx of gHTN; per MFM patient may start induction of labor today  -transfer to L&D for IOL with vaginal cytotec then pitocin  - BP monitoring  - HELLP labs WNL  - glucose monitoring  -Continue Ancef IV      d/w Dr. Praveen Martinez P-BC        Attending note   BP at office 155-155/ 41  Gestational hypertension P/C ratio - .3 and as per MFM Dr Amador recommends labor induction. LUPIS Morton Pt is a 36yo  @ 36.5wks presenting w/ UTI and labile BP with dx of gHTN; per MFM patient may start induction of labor today. Pt denies HA, visual changes, RUQ pain, contx, VB, LOF. +FM    PE:  ICU Vital Signs Last 24 Hrs  T(C): 37.2 (2021 10:05), Max: 37.3 (2021 13:44)  T(F): 98.9 (2021 10:05), Max: 99.14 (2021 13:55)  HR: 88 (2021 10:05) (70 - 95)  BP: 143/77 (2021 10:05) (112/63 - 143/77)  BP(mean): --  ABP: --  ABP(mean): --  RR: 17 (2021 10:05) (17 - 18)  SpO2: 97% (2021 10:05) (96% - 98%)  Abd: gravid soft NT  EFM: 150 moderate variability + accels no decels  [place]: none  VE:/-3 by Dr Praveen Castillo: Vtx presentation                              11.5   9.10  )-----------( 330      ( 2021 06:29 )             36.5       -    135  |  101  |  6<L>  ----------------------------<  87  3.9   |  18<L>  |  0.66    Ca    9.3      2021 06:29    TPro  6.5  /  Alb  3.0<L>  /  TBili  0.4  /  DBili  x   /  AST  18  /  ALT  7   /  AlkPhos  123<H>                Urinalysis Basic - ( 2021 16:23 )    Color: Yellow / Appearance: Slightly Turbid / S.021 / pH: x  Gluc: x / Ketone: Moderate  / Bili: Negative / Urobili: <2 mg/dL   Blood: x / Protein: 30 mg/dL / Nitrite: Positive   Leuk Esterase: Large / RBC: 3-5 /HPF / WBC >50 /HPF   Sq Epi: x / Non Sq Epi: Few / Bacteria: Many        PT/INR - ( 2021 06:29 )   PT: 11.6 sec;   INR: 1.02 ratio         PTT - ( 2021 06:29 )  PTT:27.0 sec    Lactate Trend            CAPILLARY BLOOD GLUCOSE      POCT Blood Glucose.: 84 mg/dL (2021 12:12)            36yo  @ 36.5wks presenting w/ UTI and labile BP with dx of gHTN; per MFM patient may start induction of labor today  -transfer to L&D for IOL with vaginal cytotec then pitocin  - BP monitoring  - HELLP labs WNL  - glucose monitoring  -Continue Ancef IV      d/w Dr. Praveen Martinez FNP-BC        Attending note   BP at office 155-155/  and on admission noted some in the 140 range  Gestational hypertension P/C ratio - .3 and as per MFM Dr Amador recommends labor induction. C Praveen

## 2021-01-21 NOTE — PROGRESS NOTE ADULT - ASSESSMENT
A/P: Pt is a 36yo  @ 36.5wks presenting w/ UTI and labile BP. Patient was started on IV Ancef for her UTI and started a 24hr urine protein collection. Has been normotensive overnight, but meets criteria for gHTN.     #gHTN  -Monitor BP  -24hr urine protein (): 218  -f/u AM HELLP Labs  -f/u P/C  -f/u 24hr urine protein    #UTI  -Continue Ancef IV  -Monitor VS  -f/u AM CBC  -f/u UCx    #GDMA1  -Monitor FS  -No insulin needed at this time    #MWB  -ADA Diet  -If stays, will start HSQ for DVT PPx  -SLIV    #FWB  -NST BID    Msantandreu [email or web address]

## 2021-01-22 ENCOUNTER — RESULT REVIEW (OUTPATIENT)
Age: 36
End: 2021-01-22

## 2021-01-22 ENCOUNTER — TRANSCRIPTION ENCOUNTER (OUTPATIENT)
Age: 36
End: 2021-01-22

## 2021-01-22 LAB
ALBUMIN SERPL ELPH-MCNC: 3.1 G/DL — LOW (ref 3.3–5)
ALP SERPL-CCNC: 137 U/L — HIGH (ref 40–120)
ALT FLD-CCNC: 7 U/L — SIGNIFICANT CHANGE UP (ref 4–33)
ANION GAP SERPL CALC-SCNC: 16 MMOL/L — HIGH (ref 7–14)
AST SERPL-CCNC: 20 U/L — SIGNIFICANT CHANGE UP (ref 4–32)
BILIRUB SERPL-MCNC: 0.4 MG/DL — SIGNIFICANT CHANGE UP (ref 0.2–1.2)
BUN SERPL-MCNC: 5 MG/DL — LOW (ref 7–23)
CALCIUM SERPL-MCNC: 9.3 MG/DL — SIGNIFICANT CHANGE UP (ref 8.4–10.5)
CHLORIDE SERPL-SCNC: 103 MMOL/L — SIGNIFICANT CHANGE UP (ref 98–107)
CO2 SERPL-SCNC: 16 MMOL/L — LOW (ref 22–31)
CREAT SERPL-MCNC: 0.65 MG/DL — SIGNIFICANT CHANGE UP (ref 0.5–1.3)
CULTURE RESULTS: SIGNIFICANT CHANGE UP
GLUCOSE BLDC GLUCOMTR-MCNC: 83 MG/DL — SIGNIFICANT CHANGE UP (ref 70–99)
GLUCOSE SERPL-MCNC: 80 MG/DL — SIGNIFICANT CHANGE UP (ref 70–99)
LDH SERPL L TO P-CCNC: 159 U/L — SIGNIFICANT CHANGE UP (ref 135–225)
POTASSIUM SERPL-MCNC: 3.6 MMOL/L — SIGNIFICANT CHANGE UP (ref 3.5–5.3)
POTASSIUM SERPL-SCNC: 3.6 MMOL/L — SIGNIFICANT CHANGE UP (ref 3.5–5.3)
PROT SERPL-MCNC: 6.9 G/DL — SIGNIFICANT CHANGE UP (ref 6–8.3)
SODIUM SERPL-SCNC: 135 MMOL/L — SIGNIFICANT CHANGE UP (ref 135–145)
SPECIMEN SOURCE: SIGNIFICANT CHANGE UP
URATE SERPL-MCNC: 6.6 MG/DL — SIGNIFICANT CHANGE UP (ref 2.5–7)

## 2021-01-22 PROCEDURE — 88307 TISSUE EXAM BY PATHOLOGIST: CPT | Mod: 26

## 2021-01-22 PROCEDURE — 88302 TISSUE EXAM BY PATHOLOGIST: CPT | Mod: 26

## 2021-01-22 RX ORDER — MAGNESIUM HYDROXIDE 400 MG/1
30 TABLET, CHEWABLE ORAL
Refills: 0 | Status: DISCONTINUED | OUTPATIENT
Start: 2021-01-22 | End: 2021-01-25

## 2021-01-22 RX ORDER — OXYCODONE HYDROCHLORIDE 5 MG/1
5 TABLET ORAL ONCE
Refills: 0 | Status: DISCONTINUED | OUTPATIENT
Start: 2021-01-22 | End: 2021-01-25

## 2021-01-22 RX ORDER — OXYCODONE HYDROCHLORIDE 5 MG/1
5 TABLET ORAL ONCE
Refills: 0 | Status: DISCONTINUED | OUTPATIENT
Start: 2021-01-22 | End: 2021-01-22

## 2021-01-22 RX ORDER — FAMOTIDINE 10 MG/ML
20 INJECTION INTRAVENOUS ONCE
Refills: 0 | Status: DISCONTINUED | OUTPATIENT
Start: 2021-01-22 | End: 2021-01-22

## 2021-01-22 RX ORDER — CITRIC ACID/SODIUM CITRATE 300-500 MG
30 SOLUTION, ORAL ORAL ONCE
Refills: 0 | Status: DISCONTINUED | OUTPATIENT
Start: 2021-01-22 | End: 2021-01-22

## 2021-01-22 RX ORDER — ERTAPENEM SODIUM 1 G/1
1000 INJECTION, POWDER, LYOPHILIZED, FOR SOLUTION INTRAMUSCULAR; INTRAVENOUS ONCE
Refills: 0 | Status: COMPLETED | OUTPATIENT
Start: 2021-01-22 | End: 2021-01-22

## 2021-01-22 RX ORDER — ACETAMINOPHEN 500 MG
975 TABLET ORAL
Refills: 0 | Status: DISCONTINUED | OUTPATIENT
Start: 2021-01-22 | End: 2021-01-25

## 2021-01-22 RX ORDER — ERTAPENEM SODIUM 1 G/1
INJECTION, POWDER, LYOPHILIZED, FOR SOLUTION INTRAMUSCULAR; INTRAVENOUS
Refills: 0 | Status: COMPLETED | OUTPATIENT
Start: 2021-01-22 | End: 2021-01-24

## 2021-01-22 RX ORDER — DIPHENHYDRAMINE HCL 50 MG
25 CAPSULE ORAL EVERY 6 HOURS
Refills: 0 | Status: DISCONTINUED | OUTPATIENT
Start: 2021-01-22 | End: 2021-01-25

## 2021-01-22 RX ORDER — DIPHENOXYLATE HCL/ATROPINE 2.5-.025MG
2 TABLET ORAL ONCE
Refills: 0 | Status: DISCONTINUED | OUTPATIENT
Start: 2021-01-22 | End: 2021-01-22

## 2021-01-22 RX ORDER — OXYCODONE HYDROCHLORIDE 5 MG/1
5 TABLET ORAL
Refills: 0 | Status: COMPLETED | OUTPATIENT
Start: 2021-01-22 | End: 2021-01-29

## 2021-01-22 RX ORDER — IBUPROFEN 200 MG
600 TABLET ORAL EVERY 6 HOURS
Refills: 0 | Status: COMPLETED | OUTPATIENT
Start: 2021-01-22 | End: 2021-12-21

## 2021-01-22 RX ORDER — LANOLIN
1 OINTMENT (GRAM) TOPICAL EVERY 6 HOURS
Refills: 0 | Status: DISCONTINUED | OUTPATIENT
Start: 2021-01-22 | End: 2021-01-25

## 2021-01-22 RX ORDER — METOCLOPRAMIDE HCL 10 MG
10 TABLET ORAL ONCE
Refills: 0 | Status: DISCONTINUED | OUTPATIENT
Start: 2021-01-22 | End: 2021-01-22

## 2021-01-22 RX ORDER — SODIUM CHLORIDE 9 MG/ML
1000 INJECTION, SOLUTION INTRAVENOUS
Refills: 0 | Status: DISCONTINUED | OUTPATIENT
Start: 2021-01-22 | End: 2021-01-24

## 2021-01-22 RX ORDER — TETANUS TOXOID, REDUCED DIPHTHERIA TOXOID AND ACELLULAR PERTUSSIS VACCINE, ADSORBED 5; 2.5; 8; 8; 2.5 [IU]/.5ML; [IU]/.5ML; UG/.5ML; UG/.5ML; UG/.5ML
0.5 SUSPENSION INTRAMUSCULAR ONCE
Refills: 0 | Status: DISCONTINUED | OUTPATIENT
Start: 2021-01-22 | End: 2021-01-25

## 2021-01-22 RX ORDER — OXYTOCIN 10 UNIT/ML
333.33 VIAL (ML) INJECTION
Qty: 20 | Refills: 0 | Status: DISCONTINUED | OUTPATIENT
Start: 2021-01-22 | End: 2021-01-23

## 2021-01-22 RX ORDER — SIMETHICONE 80 MG/1
80 TABLET, CHEWABLE ORAL EVERY 4 HOURS
Refills: 0 | Status: DISCONTINUED | OUTPATIENT
Start: 2021-01-22 | End: 2021-01-25

## 2021-01-22 RX ORDER — KETOROLAC TROMETHAMINE 30 MG/ML
30 SYRINGE (ML) INJECTION EVERY 6 HOURS
Refills: 0 | Status: DISCONTINUED | OUTPATIENT
Start: 2021-01-22 | End: 2021-01-23

## 2021-01-22 RX ORDER — HEPARIN SODIUM 5000 [USP'U]/ML
5000 INJECTION INTRAVENOUS; SUBCUTANEOUS EVERY 12 HOURS
Refills: 0 | Status: DISCONTINUED | OUTPATIENT
Start: 2021-01-22 | End: 2021-01-25

## 2021-01-22 RX ORDER — ERTAPENEM SODIUM 1 G/1
1000 INJECTION, POWDER, LYOPHILIZED, FOR SOLUTION INTRAMUSCULAR; INTRAVENOUS EVERY 24 HOURS
Refills: 0 | Status: COMPLETED | OUTPATIENT
Start: 2021-01-23 | End: 2021-01-23

## 2021-01-22 RX ADMIN — Medication 2 TABLET(S): at 09:00

## 2021-01-22 RX ADMIN — Medication 30 MILLILITER(S): at 07:14

## 2021-01-22 RX ADMIN — ERTAPENEM SODIUM 120 MILLIGRAM(S): 1 INJECTION, POWDER, LYOPHILIZED, FOR SOLUTION INTRAMUSCULAR; INTRAVENOUS at 11:41

## 2021-01-22 RX ADMIN — Medication 30 MILLIGRAM(S): at 18:28

## 2021-01-22 RX ADMIN — Medication 1000 MILLIUNIT(S)/MIN: at 09:38

## 2021-01-22 RX ADMIN — OXYCODONE HYDROCHLORIDE 5 MILLIGRAM(S): 5 TABLET ORAL at 13:42

## 2021-01-22 RX ADMIN — Medication 30 MILLIGRAM(S): at 11:41

## 2021-01-22 RX ADMIN — Medication 975 MILLIGRAM(S): at 20:36

## 2021-01-22 RX ADMIN — Medication 10 MILLIGRAM(S): at 07:14

## 2021-01-22 RX ADMIN — HEPARIN SODIUM 5000 UNIT(S): 5000 INJECTION INTRAVENOUS; SUBCUTANEOUS at 15:51

## 2021-01-22 RX ADMIN — Medication 975 MILLIGRAM(S): at 12:22

## 2021-01-22 RX ADMIN — FAMOTIDINE 20 MILLIGRAM(S): 10 INJECTION INTRAVENOUS at 07:14

## 2021-01-22 NOTE — DISCHARGE NOTE OB - PATIENT PORTAL LINK FT
You can access the FollowMyHealth Patient Portal offered by [facility] by registering at the following website: [email or web address]. By joining [facility]’s FollowMyHealth portal, you will also be able to view your health information using other applications (apps) compatible with our system.

## 2021-01-22 NOTE — OB RN DELIVERY SUMMARY - NS_SEPSISRSKCALC_OBGYN_ALL_OB_FT
EOS calculated successfully. EOS Risk Factor: 0.5/1000 live births ([facility] national incidence); GA=36w6d; Temp=99.14; ROM=0.383; GBS='Negative'; Antibiotics='No antibiotics or any antibiotics < 2 hrs prior to birth'

## 2021-01-22 NOTE — OB PROVIDER LABOR PROGRESS NOTE - ASSESSMENT
A/P:   - Labor: IOL for PEC. On Pit. Making change. Head ballotable, unable to AROM  - Fetus: Cat 1  - GBS: neg  - Pain: Epi in situ    Joyce Renae, PGY-1  Dr. Morton in house

## 2021-01-22 NOTE — DISCHARGE NOTE OB - CARE PROVIDER_API CALL
Jayashree Morton)  Obstetrics and Gynecology  [address] [place] [address]  Phone: [phone]  Fax: [phone]  Follow Up Time:

## 2021-01-22 NOTE — OB PROVIDER LABOR PROGRESS NOTE - NS_SUBJECTIVE/OBJECTIVE_OBGYN_ALL_OB_FT
Pt evaluated for placement of VC.
R1 OB Labor Note    S: Patient seen and examined at bedside.     T(C): 37.0 (01-22-21 @ 02:21), Max: 37.2 (01-21-21 @ 10:05)  HR: 81 (01-22-21 @ 04:10) (63 - 97)  BP: 109/59 (01-22-21 @ 03:57) (105/58 - 153/86)  BP(mean): --  ABP: --  ABP(mean): --  RR: 16 (01-21-21 @ 18:35) (16 - 17)  SpO2: 97% (01-22-21 @ 04:10) (95% - 98%)  Wt(kg): --  CVP(mm Hg): --  CI: --  CAPILLARY BLOOD GLUCOSE      POCT Blood Glucose.: 83 mg/dL (22 Jan 2021 01:42)  POCT Blood Glucose.: 100 mg/dL (21 Jan 2021 19:39)  POCT Blood Glucose.: 96 mg/dL (21 Jan 2021 13:32)  POCT Blood Glucose.: 84 mg/dL (21 Jan 2021 12:12)  POCT Blood Glucose.: 109 mg/dL (21 Jan 2021 09:38)  POCT Blood Glucose.: 83 mg/dL (21 Jan 2021 08:13)   N/A      01-20 @ 07:01  -  01-21 @ 07:00  --------------------------------------------------------  IN:    IV PiggyBack: 50 mL  Total IN: 50 mL    OUT:  Total OUT: 0 mL    Total NET: 50 mL      01-21 @ 07:01 - 01-22 @ 04:15  --------------------------------------------------------  IN:    dextrose 5% + sodium chloride 0.9%: 1062.5 mL    Lactated Ringers: 800 mL    sodium chloride 0.9%: 500 mL  Total IN: 2362.5 mL    OUT:    Voided (mL): 200 mL  Total OUT: 200 mL    Total NET: 2162.5 mL

## 2021-01-22 NOTE — OB RN INTRAOPERATIVE NOTE - NS_ADDITIONALPROCINFO1_OBGYN_ALL_OB_FT
No FHR check in OR per dr. mauricio due to fetal movement felt by .    TXA and Hemabate given by  (Anesthesia) in the OR for boggy uterus after delivery.     Code OB and  code 100 called due to breech presentation.

## 2021-01-22 NOTE — DISCHARGE NOTE OB - ADDITIONAL INSTRUCTIONS
If any headache, visual changes, epigastric pain, chest pain, shortness of breath, fever, heavy bleeding, severe pain or any other issues call md or return to hospital

## 2021-01-22 NOTE — OB RN DELIVERY SUMMARY - NS_LABORCHARACTER_OBGYN_ALL_OB
Induction of labor-AROM/Induction of labor-Medicinal/Augmentation of labor/External electronic FM/Antibiotics in labor

## 2021-01-22 NOTE — DISCHARGE NOTE OB - HOSPITAL COURSE
Patient here today for Vancomycin dose. Vanco level-17, creat-0.70. PICC patent and blood return noted pre and post infusion. Hepatic panel drawn for appt after infusion. Discharged to home with wife in stable condition.    36 yo P4 at 36 6/7 admitted the day prior with GHTN/PEC and induction for PEC, malpresentation in labor noted s/p rom with footling breech and  delivery performed emergently and postop care  Male infant Apgars 9/9  34 yo P4 at 36 6/7 admitted the day prior with GHTN/PEC and induction for PEC, malpresentation in labor noted s/p rom with footling breech and  delivery performed emergently. postop care uncomplicated.  Male infant Apgars 9/9

## 2021-01-22 NOTE — DISCHARGE NOTE OB - MEDICATION SUMMARY - MEDICATIONS TO TAKE
I will START or STAY ON the medications listed below when I get home from the hospital:    ibuprofen 600 mg oral tablet  -- 1 tab(s) by mouth every 6 hours  -- Indication: For pain    acetaminophen 325 mg oral tablet  -- 3 tab(s) by mouth   -- Indication: For pain    Prenatal 1  -- Indication: For supplement   I will START or STAY ON the medications listed below when I get home from the hospital:    ibuprofen 600 mg oral tablet  -- 1 tab(s) by mouth every 6 hours, As Needed  -- Indication: For pain    acetaminophen 325 mg oral tablet  -- 3 tab(s) by mouth , As Needed  -- Indication: For pain    Prenatal 1  -- Indication: For supplement

## 2021-01-22 NOTE — DISCHARGE NOTE OB - CARE PLAN
Principal Discharge DX:	Delivery by  section for footling breech presentation  Goal:	postpartum recovery  Assessment and plan of treatment:	induction for PEC, malpresentation in labor and  delivery and postop care

## 2021-01-22 NOTE — OB NEONATOLOGY/PEDIATRICIAN DELIVERY SUMMARY - NSPEDSNEONOTESA_OBGYN_ALL_OB_FT
Baby is a 36.6 wk GA M born to a 36 y/o  mother via emergency c section- cord prolapse, breech. Maternal history uncomplicated. Prenatal history uncomplicated. Maternal BT A+. PNL neg, NR, and immune. GBS neg on . AROM at 0703 on , clear. Baby born vigorous and crying spontaneously. WDSS. Apgars 8/9. EOS 0.11. Mom plans to breastfeed, would like hepB and circ. Covid neg

## 2021-01-22 NOTE — OB PROVIDER DELIVERY SUMMARY - NSPROVIDERDELIVERYNOTE_OBGYN_ALL_OB_FT
primary stat  section due to malpresentation breech   1 LFT  section   male infant apgars 9/9 Breech delivered as patient was footling breech   EBL- 900cc   normal uterus and ovaries   normal tubes - bilateral salpingectomy performed as patient desires permanent sterilization

## 2021-01-23 LAB
BASOPHILS # BLD AUTO: 0.06 K/UL — SIGNIFICANT CHANGE UP (ref 0–0.2)
BASOPHILS NFR BLD AUTO: 0.3 % — SIGNIFICANT CHANGE UP (ref 0–2)
EOSINOPHIL # BLD AUTO: 0.11 K/UL — SIGNIFICANT CHANGE UP (ref 0–0.5)
EOSINOPHIL NFR BLD AUTO: 0.6 % — SIGNIFICANT CHANGE UP (ref 0–6)
HCT VFR BLD CALC: 32.5 % — LOW (ref 34.5–45)
HGB BLD-MCNC: 10.9 G/DL — LOW (ref 11.5–15.5)
IANC: 14.06 K/UL — HIGH (ref 1.5–8.5)
IMM GRANULOCYTES NFR BLD AUTO: 0.7 % — SIGNIFICANT CHANGE UP (ref 0–1.5)
LYMPHOCYTES # BLD AUTO: 16.2 % — SIGNIFICANT CHANGE UP (ref 13–44)
LYMPHOCYTES # BLD AUTO: 2.96 K/UL — SIGNIFICANT CHANGE UP (ref 1–3.3)
MCHC RBC-ENTMCNC: 29.4 PG — SIGNIFICANT CHANGE UP (ref 27–34)
MCHC RBC-ENTMCNC: 33.5 GM/DL — SIGNIFICANT CHANGE UP (ref 32–36)
MCV RBC AUTO: 87.6 FL — SIGNIFICANT CHANGE UP (ref 80–100)
MONOCYTES # BLD AUTO: 1 K/UL — HIGH (ref 0–0.9)
MONOCYTES NFR BLD AUTO: 5.5 % — SIGNIFICANT CHANGE UP (ref 2–14)
NEUTROPHILS # BLD AUTO: 14.06 K/UL — HIGH (ref 1.8–7.4)
NEUTROPHILS NFR BLD AUTO: 76.7 % — SIGNIFICANT CHANGE UP (ref 43–77)
NRBC # BLD: 0 /100 WBCS — SIGNIFICANT CHANGE UP
NRBC # FLD: 0 K/UL — SIGNIFICANT CHANGE UP
PLATELET # BLD AUTO: 319 K/UL — SIGNIFICANT CHANGE UP (ref 150–400)
RBC # BLD: 3.71 M/UL — LOW (ref 3.8–5.2)
RBC # FLD: 14.1 % — SIGNIFICANT CHANGE UP (ref 10.3–14.5)
WBC # BLD: 18.31 K/UL — HIGH (ref 3.8–10.5)
WBC # FLD AUTO: 18.31 K/UL — HIGH (ref 3.8–10.5)

## 2021-01-23 RX ORDER — IBUPROFEN 200 MG
1 TABLET ORAL
Qty: 0 | Refills: 0 | DISCHARGE
Start: 2021-01-23

## 2021-01-23 RX ORDER — IBUPROFEN 200 MG
600 TABLET ORAL EVERY 6 HOURS
Refills: 0 | Status: DISCONTINUED | OUTPATIENT
Start: 2021-01-23 | End: 2021-01-25

## 2021-01-23 RX ORDER — ACETAMINOPHEN 500 MG
3 TABLET ORAL
Qty: 0 | Refills: 0 | DISCHARGE
Start: 2021-01-23

## 2021-01-23 RX ADMIN — Medication 975 MILLIGRAM(S): at 17:55

## 2021-01-23 RX ADMIN — SIMETHICONE 80 MILLIGRAM(S): 80 TABLET, CHEWABLE ORAL at 17:56

## 2021-01-23 RX ADMIN — MAGNESIUM HYDROXIDE 30 MILLILITER(S): 400 TABLET, CHEWABLE ORAL at 17:56

## 2021-01-23 RX ADMIN — HEPARIN SODIUM 5000 UNIT(S): 5000 INJECTION INTRAVENOUS; SUBCUTANEOUS at 02:37

## 2021-01-23 RX ADMIN — Medication 30 MILLIGRAM(S): at 00:04

## 2021-01-23 RX ADMIN — ERTAPENEM SODIUM 120 MILLIGRAM(S): 1 INJECTION, POWDER, LYOPHILIZED, FOR SOLUTION INTRAMUSCULAR; INTRAVENOUS at 12:59

## 2021-01-23 RX ADMIN — Medication 975 MILLIGRAM(S): at 10:00

## 2021-01-23 RX ADMIN — Medication 600 MILLIGRAM(S): at 13:00

## 2021-01-23 RX ADMIN — Medication 600 MILLIGRAM(S): at 21:05

## 2021-01-23 RX ADMIN — Medication 975 MILLIGRAM(S): at 02:00

## 2021-01-23 RX ADMIN — HEPARIN SODIUM 5000 UNIT(S): 5000 INJECTION INTRAVENOUS; SUBCUTANEOUS at 13:01

## 2021-01-23 RX ADMIN — Medication 30 MILLIGRAM(S): at 05:55

## 2021-01-23 NOTE — PROGRESS NOTE ADULT - PROBLEM SELECTOR PLAN 1
- Continue with po analgesia  - Increase ambulation  - Continue regular diet  - CBC appropriate  - Monitor BP    Lisa Cain  PGY-1

## 2021-01-23 NOTE — PROGRESS NOTE ADULT - ASSESSMENT
36[facility] . Pregnancy complicated by PEC, P/C: 0.3, HELLP labs wnl() and UTI (on Ancef and Invanz). H/H 10.9/32.5. No current issues.

## 2021-01-24 RX ORDER — OXYCODONE HYDROCHLORIDE 5 MG/1
5 TABLET ORAL
Refills: 0 | Status: DISCONTINUED | OUTPATIENT
Start: 2021-01-24 | End: 2021-01-25

## 2021-01-24 RX ORDER — FERROUS SULFATE 325(65) MG
325 TABLET ORAL DAILY
Refills: 0 | Status: DISCONTINUED | OUTPATIENT
Start: 2021-01-24 | End: 2021-01-25

## 2021-01-24 RX ADMIN — Medication 600 MILLIGRAM(S): at 18:31

## 2021-01-24 RX ADMIN — Medication 325 MILLIGRAM(S): at 13:23

## 2021-01-24 RX ADMIN — Medication 975 MILLIGRAM(S): at 09:15

## 2021-01-24 RX ADMIN — Medication 975 MILLIGRAM(S): at 00:15

## 2021-01-24 RX ADMIN — OXYCODONE HYDROCHLORIDE 5 MILLIGRAM(S): 5 TABLET ORAL at 00:23

## 2021-01-24 RX ADMIN — Medication 600 MILLIGRAM(S): at 05:43

## 2021-01-24 RX ADMIN — HEPARIN SODIUM 5000 UNIT(S): 5000 INJECTION INTRAVENOUS; SUBCUTANEOUS at 13:23

## 2021-01-24 RX ADMIN — HEPARIN SODIUM 5000 UNIT(S): 5000 INJECTION INTRAVENOUS; SUBCUTANEOUS at 00:24

## 2021-01-24 RX ADMIN — OXYCODONE HYDROCHLORIDE 5 MILLIGRAM(S): 5 TABLET ORAL at 05:43

## 2021-01-24 RX ADMIN — Medication 1 TABLET(S): at 13:23

## 2021-01-24 RX ADMIN — OXYCODONE HYDROCHLORIDE 5 MILLIGRAM(S): 5 TABLET ORAL at 09:15

## 2021-01-24 RX ADMIN — OXYCODONE HYDROCHLORIDE 5 MILLIGRAM(S): 5 TABLET ORAL at 17:16

## 2021-01-24 RX ADMIN — Medication 600 MILLIGRAM(S): at 13:23

## 2021-01-25 VITALS
DIASTOLIC BLOOD PRESSURE: 86 MMHG | HEART RATE: 75 BPM | OXYGEN SATURATION: 99 % | TEMPERATURE: 98 F | SYSTOLIC BLOOD PRESSURE: 138 MMHG | RESPIRATION RATE: 17 BRPM

## 2021-01-25 RX ADMIN — HEPARIN SODIUM 5000 UNIT(S): 5000 INJECTION INTRAVENOUS; SUBCUTANEOUS at 00:03

## 2021-01-25 RX ADMIN — SIMETHICONE 80 MILLIGRAM(S): 80 TABLET, CHEWABLE ORAL at 10:51

## 2021-01-25 RX ADMIN — Medication 600 MILLIGRAM(S): at 00:03

## 2021-01-25 RX ADMIN — Medication 975 MILLIGRAM(S): at 00:03

## 2021-01-25 RX ADMIN — Medication 600 MILLIGRAM(S): at 06:36

## 2021-01-25 RX ADMIN — Medication 975 MILLIGRAM(S): at 06:36

## 2021-01-25 NOTE — PROGRESS NOTE ADULT - SUBJECTIVE AND OBJECTIVE BOX
Attending note    patient on exam noted 7cm / 80 %  -3 around 5am Then repeat exam at 7 am noted with large bulging bag  vtx -3   fundal pressure applied and with contraction AROM done with ISE to cause a small opening and to allow for the fluid to come  out slowly. lots of clear fluid noted and presenting part loss and noted upon palpating the part again that it was hip or buttocks on patient's left   then noted foot. Patient was explained regarding change of presentation of baby and need for emergent  delivery   She verbalized understanding and requested to do the sterilization as per our previous discussions in the office-  she reaffirm her desire for sterilization  patient transferred expeditiously to the operating room and  section performed FHRT prior to transfer showed continued category 1 tracing 
She is a  35y woman who is now post-operative day: 2    Subjective:  Pt without complaints.  Pain contolled.  +ambulating.  +void.  Tolerating regular diet.  No nausea/vomiting. Lochia WNL.    Objective:  Vital Signs Last 24 Hrs  T(C): 36.6 (2021 05:55), Max: 36.8 (2021 14:43)  T(F): 97.8 (2021 05:55), Max: 98.2 (2021 14:43)  HR: 78 (2021 05:55) (78 - 90)  BP: 131/77 (2021 05:55) (117/74 - 133/63)  BP(mean): 90 (2021 05:55) (83 - 90)  RR: 18 (2021 05:55) (17 - 18)  SpO2: 99% (2021 05:55) (97% - 99%)    Constitutional: NAD  Breast: Soft, nontender, not engorged.  Abdomen: Soft, nontender, no distension.  Uterine fundus firm, non-tender.  Incision: Clean, dry, and intact  Ext: No calf tenderness bilaterally, negative Phi's sign    LABS:                        10.9   18.31 )-----------( 319      ( 2021 06:26 )             32.5                         11.9   9.77  )-----------( 310      ( 2021 23:41 )             36.0                         11.5   9.10  )-----------( 330      ( 2021 06:29 )             36.5     Treponema Pallidum Antibody Interpretation: Negative ( @ 10:25)  Rh Interpretation: Positive ( @ 06:44)  ABO Interpretation: B ( @ 06:44)  Antibody Screen: Negative ( @ 06:44)  ABO Interpretation: B ( @ 10:10)  Rh Interpretation: Positive ( @ 10:10)  Antibody Screen: Negative ( @ 10:10)  Treponema Pallidum Antibody Interpretation: Negative ( @ 10:48)  ABO Interpretation: B ( @ 23:44)  Rh Interpretation: Positive (12-18 @ 23:44)  Antibody Screen: Negative (18 @ 23:44)  Rh Interpretation: Positive (18 @ 23:44)  ABO Interpretation: B (18 @ 23:44)        Allergies    Bactrim (Fever; Rash)  sulfa drugs (Rash)    Intolerances      MEDICATIONS  (STANDING):  acetaminophen   Tablet .. 975 milliGRAM(s) Oral <User Schedule>  diphtheria/tetanus/pertussis (acellular) Vaccine (ADAcel) 0.5 milliLiter(s) IntraMuscular once  ferrous    sulfate 325 milliGRAM(s) Oral daily  heparin   Injectable 5000 Unit(s) SubCutaneous every 12 hours  ibuprofen  Tablet. 600 milliGRAM(s) Oral every 6 hours  prenatal multivitamin 1 Tablet(s) Oral daily    MEDICATIONS  (PRN):  diphenhydrAMINE 25 milliGRAM(s) Oral every 6 hours PRN Pruritus  lanolin Ointment 1 Application(s) Topical every 6 hours PRN Sore Nipples  magnesium hydroxide Suspension 30 milliLiter(s) Oral two times a day PRN Constipation  oxyCODONE    IR 5 milliGRAM(s) Oral once PRN Moderate to Severe Pain (4-10)  oxyCODONE    IR 5 milliGRAM(s) Oral every 3 hours PRN Moderate to Severe Pain (4-10)  simethicone 80 milliGRAM(s) Chew every 4 hours PRN Gas        Assessment and Plan  Pt stable POD #2 s/p  section, pre-eclampsia without severe features.  BPs controlled without medications.  No signs/symptoms of worsening pre-eclampsia. On Invanz x2 doses for ppx due to emergent nature of .  -continue routine postoperative and postpartum care  -encourage ambulation  -analgesia prn  -discharge planning    MD Kim    
Attending note     FHRT category 1   continue induction s/p one dose of po Cytotec   noted 1 thick vtx -3   cervical balloon placed and Pitocin for continue induction C Praveen
SUBJECTIVE:  Pain: well controlled  Complaints:none  MILESTONES:  Alert and oriented x 3  [x  ]  Out of bed/ ambulating. [x  ]  Flatus: [x  ]  Postive [  ] Negative   Bowel movement  [  ] Positive [ x ] Negative   Voiding [x  ] Due to void [  ]   Diet: Regular [ x ]  Clears [  ]  NPO [  ]  Infant feeding:  Breast [ x ]   Bottle [  ]  Both [  ]  Feeding related inssues and/or concerns: none    OBJECTIVE:  T(C): 36.6 (21 @ 05:00), Max: 37.1 (21 @ 14:31)  HR: 66 (21 @ 05:00) (66 - 78)  BP: 134/89 (21 @ 05:00) (128/78 - 134/89)  RR: 17 (21 @ 05:00) (16 - 18)  SpO2: 98% (21 @ 05:00) (97% - 98%)  Wt(kg): --      MEDICATIONS  (STANDING):  acetaminophen   Tablet .. 975 milliGRAM(s) Oral <User Schedule>  diphtheria/tetanus/pertussis (acellular) Vaccine (ADAcel) 0.5 milliLiter(s) IntraMuscular once  ferrous    sulfate 325 milliGRAM(s) Oral daily  heparin   Injectable 5000 Unit(s) SubCutaneous every 12 hours  ibuprofen  Tablet. 600 milliGRAM(s) Oral every 6 hours  prenatal multivitamin 1 Tablet(s) Oral daily    MEDICATIONS  (PRN):  diphenhydrAMINE 25 milliGRAM(s) Oral every 6 hours PRN Pruritus  lanolin Ointment 1 Application(s) Topical every 6 hours PRN Sore Nipples  magnesium hydroxide Suspension 30 milliLiter(s) Oral two times a day PRN Constipation  oxyCODONE    IR 5 milliGRAM(s) Oral once PRN Moderate to Severe Pain (4-10)  oxyCODONE    IR 5 milliGRAM(s) Oral every 3 hours PRN Moderate to Severe Pain (4-10)  simethicone 80 milliGRAM(s) Chew every 4 hours PRN Gas      ASSESSMENT:  35y  y/o G 6  P 5   PO Day#  2   gHTN, HELLP nl, no c/o HA, Blurred vision, epigastric pain or any other discomforts   Delivery: Primary [ x ]    Repeat [  ]                                       Indication of procedure: breech  Condition: Stable  Past Medical History significant for: HPI:  Patient of Dr Morton  34 y/o female, para 4014 @ 36+4 wks gestation, single IUP.  Referred from MDs office for further evaluation due to elevated blood pressures in this office 155/95, 153/100. Pt asymptomatic, denies headache, dizziness, visual changes/scotoma, N+V, RUQ/Epigastric pain. Pt with history of elevated BPs, completed 24 hr urine on 21 with 218mg protein.   Pt endorses strong fetal movement, denies any leakage of fluid, vaginal bleeding or contractions.  A/P Complications: Gestational hypertension. Increased risk for T21 on nuchal, NIPT Low risk. Newly diagnosed GDM, not yet classified-had appt today, has not been monitoring FS.   Blood Transfusion: Patient will accept blood  Covid Assessment: Pt denies any: fever, chills, cough, SOB or any recent known exposure to Covid-19.  Allergies: Bactrim, Sulfa drugs (Rash)  Meds: PNV  PMH: Denies  PSH: Denies  OBgynHx    2003-Uncomplicated  @ 39 wks. Female, 5#5  2007-Uncomplicated  @ 40 wks. Female, 7#5  2009-Uncomplicated  @ 40 wks. Female, 7#8  2015-Uncomplicated  @ 39 wks. Female, 7#  2019-Complete SAB @ 6 wks  Pt denies any h/o: Abn. PAP, ovarian cysts, uterine fibroids, breast problems, STDs/STIs  PSY: Denies  EtOH/ Smoke/ Recreational substance use: denies  FH: Denies  H/W/BMI: 65"/195#/   (2021 20:26)    Current Issues: none  Heart:     RRR                         Lungs: clear  Breasts:  Soft [x  ]   Engorged [  ]  Abdomen: Soft [ x ] , distended [  ] nontender [ x ]   Bowel sounds :  Present [ x ]  Absent [  ]   Fundus firm [ x ]  Boggy [  ]  Abdominal incision: Clean, dry and intact [ x ]  Staples [  ] Steri Strips [  ] Dermabond [  ] Sutures [ x ] SVETLANA ( )  Patient wearing abdominal binder for support.  Vaginal: Lochia:  Heavy [  ]  Moderate [  ]   Scant [ x ]  Extremities: Edema [ 0 ] negative Phi's Sign [x  ] Nontender Marcello  [ x ] Positive pedal pulses [  x]  Other relevant physical exam findings:none      PLAN:  Plan: Increase ambulation, analgesia PRN and pain medication protocol standing oxycodone, ibuprofen and acetaminophen.  Diet: Regular diet  Continue routine post-operative and postpartum care.   Discharge Planning [  ]  Consults:   Social Work [  ] Lac[place] [  ] Other [  ]   
She is a  35y woman who is now post-operative day: 1    Subjective:  Pt without complaints.  Pain contolled.  +OOB.  Layne in place.  Tolerating regular diet.  No nausea/vomiting.  No flatus.  Lochia WNL.    Objective:  Vital Signs Last 24 Hrs  T(C): 36.7 (2021 06:32), Max: 37 (2021 18:52)  T(F): 98.1 (2021 06:32), Max: 98.6 (2021 18:52)  HR: 84 (2021 06:32) (69 - 84)  BP: 117/74 (2021 06:32) (114/69 - 130/88)  BP(mean): 83 (2021 06:32) (83 - 83)  RR: 17 (2021 06:32) (17 - 18)  SpO2: 98% (2021 06:32) (94% - 99%)    Constitutional: NAD  Abdomen: Soft, non-tender, non-distended.  Uterine fundus firm, non-tender.  Incision: Clean, dry, and intact  Ext: No calf tenderness bilaterally, negative Phi's sign    LABS:                        10.9   18.31 )-----------( 319      ( 2021 06:26 )             32.5                         11.9   9.77  )-----------( 310      ( 2021 23:41 )             36.0                         11.5   9.10  )-----------( 330      ( 2021 06:29 )             36.5     Treponema Pallidum Antibody Interpretation: Negative ( @ 10:25)  Rh Interpretation: Positive ( @ 06:44)  ABO Interpretation: B ( @ 06:44)  Antibody Screen: Negative ( @ 06:44)  ABO Interpretation: B ( @ 10:10)  Rh Interpretation: Positive ( @ 10:10)  Antibody Screen: Negative ( @ 10:10)  Treponema Pallidum Antibody Interpretation: Negative ( @ 10:48)  ABO Interpretation: B ( @ 23:44)  Rh Interpretation: Positive (12-18 @ 23:44)  Antibody Screen: Negative ( @ 23:44)  Rh Interpretation: Positive ( @ 23:44)  ABO Interpretation: B ( @ 23:44)          Allergies    Bactrim (Fever; Rash)  sulfa drugs (Rash)    Intolerances      MEDICATIONS  (STANDING):  acetaminophen   Tablet .. 975 milliGRAM(s) Oral <User Schedule>  diphtheria/tetanus/pertussis (acellular) Vaccine (ADAcel) 0.5 milliLiter(s) IntraMuscular once  ertapenem  IVPB      ertapenem  IVPB 1000 milliGRAM(s) IV Intermittent every 24 hours  heparin   Injectable 5000 Unit(s) SubCutaneous every 12 hours  ibuprofen  Tablet. 600 milliGRAM(s) Oral every 6 hours  lactated ringers. 1000 milliLiter(s) (125 mL/Hr) IV Continuous <Continuous>    MEDICATIONS  (PRN):  diphenhydrAMINE 25 milliGRAM(s) Oral every 6 hours PRN Pruritus  lanolin Ointment 1 Application(s) Topical every 6 hours PRN Sore Nipples  magnesium hydroxide Suspension 30 milliLiter(s) Oral two times a day PRN Constipation  oxyCODONE    IR 5 milliGRAM(s) Oral every 3 hours PRN Moderate to Severe Pain (4-10)  oxyCODONE    IR 5 milliGRAM(s) Oral once PRN Moderate to Severe Pain (4-10)  simethicone 80 milliGRAM(s) Chew every 4 hours PRN Gas        Assessment and Plan  Pt stable POD #1 s/p  section.  pre-eclampsia without severe features.  BPs controlled without medications.  On Invanz x2 doses for ppx due to emergent nature of .  -continue routine postoperative and postpartum care  -encourage ambulation  -analgesia prn  -discharge planning for     MD Kim    
Postop Day  __1_ s/p   C- Section    THERAPY:  [ x ] Spinal morphine   [  ] Epidural morphine   [  ] IV PCA Hydromorphone 1 mg/ml      Sedation Score:	  [ x ] Alert	    [  ] Drowsy        [  ] Arousable	[  ] Asleep	[  ] Unresponsive    Side Effects:	  [ x ] None	     [  ] Nausea        [  ] Pruritus        [  ] Weakness   [  ] Numbness        ASSESSMENT/ PLAN   [   ] Discontinue         [  ] Continue  [ x ]Documentation and Verification of current medications     Comments:       Patient is doing well.  OOBAA. Tolerating clears.  Pain is tolerable.  No residual anesthetic issues or complications noted.  
R3 Progress note: HD# 2    Patient seen and examined at bedside, no acute overnight events. No acute complaints.     Pt reports +FM. Denies LOF, VB, ctx, HA, epigastric pain, blurred vision, CP, SOB, N/V, fevers, and chills.    Vital Signs Last 24 Hours  T(C): 36.7 (01-21-21 @ 05:53), Max: 37.3 (01-20-21 @ 13:44)  HR: 76 (01-21-21 @ 05:53) (70 - 95)  BP: 120/68 (01-21-21 @ 05:53) (112/63 - 140/87)  RR: 17 (01-21-21 @ 05:53) (17 - 18)  SpO2: 97% (01-21-21 @ 05:53) (96% - 98%)    CAPILLARY BLOOD GLUCOSE          Physical Exam:  General: NAD  Abdomen: Soft, non-tender, gravid  Ext: No pain or swelling    Labs:             11.5   9.10  )-----------( 330      ( 01-21 @ 06:29 )             36.5     01-21 @ 06:29    135  |  101  |  6   ----------------------------<  87  3.9   |  18  |  0.66    Ca    9.3      01-21 @ 06:29    TPro  6.5  /  Alb  3.0  /  TBili  0.4  /  DBili  x   /  AST  18  /  ALT  7   /  AlkPhos  123  01-21 @ 06:29    PT/INR - ( 01-21 @ 06:29 )   PT: 11.6 sec;   INR: 1.02 ratio    PTT - ( 01-21 @ 06:29 )  PTT:27.0 sec    Uric Acid: (01-21 @ 06:29)  6.6      Fibrinogen: (01-21 @ 06:29)  554      LDH: (01-21 @ 06:29)  146        MEDICATIONS  (STANDING):  ceFAZolin   IVPB 1000 milliGRAM(s) IV Intermittent every 8 hours    MEDICATIONS  (PRN):        Msantandreu PGY3
R1 Progress Note     35 [facility] Patient seen and examined at bedside, no acute overnight events. No acute complaints, pain well controlled. Patient is ambulating and tolerating regular diet. Has been voiding spontaneously, passing flatus. Denies CP, SOB, N/V, HA, blurred vision, epigastric pain, fevers, chills.    Vital Signs Last 24 Hours  T(C): 36.7 (01-23-21 @ 06:32), Max: 37 (01-22-21 @ 18:52)  HR: 84 (01-23-21 @ 06:32) (69 - 84)  BP: 117/74 (01-23-21 @ 06:32) (114/69 - 130/88)  RR: 17 (01-23-21 @ 06:32) (17 - 18)  SpO2: 98% (01-23-21 @ 06:32) (94% - 99%)    I&O's Summary    22 Jan 2021 07:01  -  23 Jan 2021 07:00  --------------------------------------------------------  IN: 1975 mL / OUT: 2966 mL / NET: -991 mL        Physical Exam:  General: NAD  Abdomen: Soft, non-tender, non-distended, fundus firm  Incision: Pfannenstiel incision CDI, subcuticular suture closure  Pelvic: Lochia wnl    Labs:    Blood Type: B Positive  Antibody Screen: Negative  RPR: Negative               10.9   18.31 )-----------( 319      ( 01-23 @ 06:26 )             32.5                11.9   9.77  )-----------( 310      ( 01-21 @ 23:41 )             36.0                11.5   9.10  )-----------( 330      ( 01-21 @ 06:29 )             36.5         MEDICATIONS  (STANDING):  acetaminophen   Tablet .. 975 milliGRAM(s) Oral <User Schedule>  diphtheria/tetanus/pertussis (acellular) Vaccine (ADAcel) 0.5 milliLiter(s) IntraMuscular once  heparin   Injectable 5000 Unit(s) SubCutaneous every 12 hours  ibuprofen  Tablet. 600 milliGRAM(s) Oral every 6 hours  lactated ringers. 1000 milliLiter(s) (125 mL/Hr) IV Continuous <Continuous>    MEDICATIONS  (PRN):  diphenhydrAMINE 25 milliGRAM(s) Oral every 6 hours PRN Pruritus  lanolin Ointment 1 Application(s) Topical every 6 hours PRN Sore Nipples  magnesium hydroxide Suspension 30 milliLiter(s) Oral two times a day PRN Constipation  oxyCODONE    IR 5 milliGRAM(s) Oral every 3 hours PRN Moderate to Severe Pain (4-10)  oxyCODONE    IR 5 milliGRAM(s) Oral once PRN Moderate to Severe Pain (4-10)  simethicone 80 milliGRAM(s) Chew every 4 hours PRN Gas

## 2021-01-28 ENCOUNTER — APPOINTMENT (OUTPATIENT)
Dept: ANTEPARTUM | Facility: CLINIC | Age: 36
End: 2021-01-28

## 2021-02-04 ENCOUNTER — APPOINTMENT (OUTPATIENT)
Dept: MATERNAL FETAL MEDICINE | Facility: CLINIC | Age: 36
End: 2021-02-04

## 2021-02-22 ENCOUNTER — APPOINTMENT (OUTPATIENT)
Dept: ANTEPARTUM | Facility: CLINIC | Age: 36
End: 2021-02-22

## 2021-02-23 ENCOUNTER — APPOINTMENT (OUTPATIENT)
Dept: CARDIOLOGY | Facility: CLINIC | Age: 36
End: 2021-02-23
Payer: COMMERCIAL

## 2021-02-23 ENCOUNTER — NON-APPOINTMENT (OUTPATIENT)
Age: 36
End: 2021-02-23

## 2021-02-23 VITALS — SYSTOLIC BLOOD PRESSURE: 159 MMHG | DIASTOLIC BLOOD PRESSURE: 80 MMHG

## 2021-02-23 PROCEDURE — 93000 ELECTROCARDIOGRAM COMPLETE: CPT

## 2021-02-23 PROCEDURE — 99214 OFFICE O/P EST MOD 30 MIN: CPT

## 2021-02-23 PROCEDURE — 99072 ADDL SUPL MATRL&STAF TM PHE: CPT

## 2021-02-23 NOTE — HISTORY OF PRESENT ILLNESS
[FreeTextEntry1] : 35 year old woman with no pmh presents for a followup visit. \par Since her last visit with me in 2019.\par \par Since then she became pregnant with her 5th child which was delivered via c section because of possible preeclampsia and breeched position of the baby.  She is now post partum 1 month and has been noted to intermittent hypertensin. Her BP has ranged 120-150s/70-80s. She notes that she gets anxious at times of checking her BP. She can feel mild chest pressure when getting anxious, that is nonradiating, nonexertional, self limiting. She denies any blurry vision, headaches or recent stroke like symptoms. She denies any dyspnea. \par She   denies any  PND, orthopnea, lower extremity edema, near syncope, syncope. She does eat more salty food. She is no longer breast feeding.

## 2021-02-23 NOTE — DISCUSSION/SUMMARY
[FreeTextEntry1] : 35 year woman with a history as listed presents for an initial cardiac evaluation. \par Jaylin recent pregnancy was complicated by preeclampsia. She is still hypertensive on exam today. She will start on Labetalol 100mg q12. She will monitor her BP q12 at home. She will lower her salt intake. \par Her EKG show anterior TWI which were previously seen but more prominent now. She will get a 2d echo to assess for any  new structural heart disease, changes in valvular and ventricular function. She will eventually need another ischemic evaluation. \par Exercise and diet counseling was performed in order to reduce her future cardiovascular risk. \par She will followup with me in 1-2 months or sooner if necessary.

## 2021-02-23 NOTE — PHYSICAL EXAM
[Well Groomed] : well groomed [Normal Conjunctiva] : the conjunctiva exhibited no abnormalities [General Appearance - In No Acute Distress] : no acute distress [Eyelids - No Xanthelasma] : the eyelids demonstrated no xanthelasmas [Normal Jugular Venous A Waves Present] : normal jugular venous A waves present [Normal Jugular Venous V Waves Present] : normal jugular venous V waves present [No Jugular Venous Magallanes A Waves] : no jugular venous magallanes A waves [Respiration, Rhythm And Depth] : normal respiratory rhythm and effort [Exaggerated Use Of Accessory Muscles For Inspiration] : no accessory muscle use [Abdomen Soft] : soft [Auscultation Breath Sounds / Voice Sounds] : lungs were clear to auscultation bilaterally [Abdomen Tenderness] : non-tender [Abdomen Mass (___ Cm)] : no abdominal mass palpated [Abnormal Walk] : normal gait [Gait - Sufficient For Exercise Testing] : the gait was sufficient for exercise testing [Nail Clubbing] : no clubbing of the fingernails [Cyanosis, Localized] : no localized cyanosis [Petechial Hemorrhages (___cm)] : no petechial hemorrhages [Skin Color & Pigmentation] : normal skin color and pigmentation [] : no rash [No Venous Stasis] : no venous stasis [Skin Lesions] : no skin lesions [No Skin Ulcers] : no skin ulcer [No Xanthoma] : no  xanthoma was observed [Oriented To Time, Place, And Person] : oriented to person, place, and time [Affect] : the affect was normal [Mood] : the mood was normal [No Anxiety] : not feeling anxious [Normal Rate] : normal [Rhythm Regular] : regular [Normal S1] : normal S1 [Normal S2] : normal S2 [No Gallop] : no gallop heard [No Murmur] : no murmurs heard [2+] : left 2+ [No Pitting Edema] : no pitting edema present [FreeTextEntry1] : dry MM [Right Carotid Bruit] : no bruit heard over the right carotid [Left Carotid Bruit] : no bruit heard over the left carotid [Bruit] : no bruit heard [Rt] : no varicose veins of the right leg [Lt] : no varicose veins of the left leg

## 2021-02-23 NOTE — REVIEW OF SYSTEMS
[Chest  Pressure] : chest pressure [Chest Pain] : chest pain [Negative] : Heme/Lymph [Shortness Of Breath] : no shortness of breath [Heartburn] : no heartburn

## 2021-03-14 LAB — SURGICAL PATHOLOGY STUDY: SIGNIFICANT CHANGE UP

## 2021-03-18 ENCOUNTER — APPOINTMENT (OUTPATIENT)
Dept: CARDIOLOGY | Facility: CLINIC | Age: 36
End: 2021-03-18
Payer: COMMERCIAL

## 2021-03-18 PROCEDURE — 93306 TTE W/DOPPLER COMPLETE: CPT

## 2021-03-18 PROCEDURE — 99072 ADDL SUPL MATRL&STAF TM PHE: CPT

## 2021-03-23 ENCOUNTER — TRANSCRIPTION ENCOUNTER (OUTPATIENT)
Age: 36
End: 2021-03-23

## 2021-03-24 ENCOUNTER — APPOINTMENT (OUTPATIENT)
Dept: CARDIOLOGY | Facility: CLINIC | Age: 36
End: 2021-03-24
Payer: COMMERCIAL

## 2021-03-24 VITALS
SYSTOLIC BLOOD PRESSURE: 158 MMHG | WEIGHT: 182 LBS | BODY MASS INDEX: 30.32 KG/M2 | HEART RATE: 65 BPM | OXYGEN SATURATION: 95 % | HEIGHT: 65 IN | DIASTOLIC BLOOD PRESSURE: 91 MMHG

## 2021-03-24 PROCEDURE — 99214 OFFICE O/P EST MOD 30 MIN: CPT

## 2021-03-24 PROCEDURE — 99072 ADDL SUPL MATRL&STAF TM PHE: CPT

## 2021-03-24 RX ORDER — LABETALOL HYDROCHLORIDE 100 MG/1
100 TABLET, FILM COATED ORAL
Qty: 60 | Refills: 3 | Status: DISCONTINUED | COMMUNITY
Start: 2021-02-23 | End: 2021-03-24

## 2021-03-24 NOTE — HISTORY OF PRESENT ILLNESS
[FreeTextEntry1] : 35 year old woman with preeclampsia, s/p tubal ligation. \par She became pregnant with her 5th child which was delivered via c section because of possible preeclampsia and breached position of the baby.  She is now post partum 1 month and has been noted to intermittent hypertensin.\par \par She is here for a followup visit. \par She has noted that her BP at home ranged 120-150s/70-80s.  She notes that her BP is high when she gets anxious. She notes when she is relaxed her BP is normal. \par  She   denies any chest pain, PND, orthopnea, lower extremity edema, near syncope, syncope, strokelike symptoms.\par She is not breast feeding. \par She is compliant with her medications. \par

## 2021-03-24 NOTE — PHYSICAL EXAM
[Well Groomed] : well groomed [General Appearance - In No Acute Distress] : no acute distress [Normal Conjunctiva] : the conjunctiva exhibited no abnormalities [Eyelids - No Xanthelasma] : the eyelids demonstrated no xanthelasmas [Normal Jugular Venous A Waves Present] : normal jugular venous A waves present [Normal Jugular Venous V Waves Present] : normal jugular venous V waves present [No Jugular Venous Magallanes A Waves] : no jugular venous magallanes A waves [Respiration, Rhythm And Depth] : normal respiratory rhythm and effort [Exaggerated Use Of Accessory Muscles For Inspiration] : no accessory muscle use [Auscultation Breath Sounds / Voice Sounds] : lungs were clear to auscultation bilaterally [Abdomen Soft] : soft [Abdomen Tenderness] : non-tender [Abdomen Mass (___ Cm)] : no abdominal mass palpated [Abnormal Walk] : normal gait [Gait - Sufficient For Exercise Testing] : the gait was sufficient for exercise testing [Nail Clubbing] : no clubbing of the fingernails [Cyanosis, Localized] : no localized cyanosis [Petechial Hemorrhages (___cm)] : no petechial hemorrhages [Skin Color & Pigmentation] : normal skin color and pigmentation [] : no rash [No Venous Stasis] : no venous stasis [Skin Lesions] : no skin lesions [No Skin Ulcers] : no skin ulcer [No Xanthoma] : no  xanthoma was observed [Oriented To Time, Place, And Person] : oriented to person, place, and time [Affect] : the affect was normal [Mood] : the mood was normal [No Anxiety] : not feeling anxious [Normal Rate] : normal [Rhythm Regular] : regular [Normal S1] : normal S1 [Normal S2] : normal S2 [No Gallop] : no gallop heard [No Murmur] : no murmurs heard [2+] : left 2+ [No Pitting Edema] : no pitting edema present [FreeTextEntry1] : dry MM [Right Carotid Bruit] : no bruit heard over the right carotid [Left Carotid Bruit] : no bruit heard over the left carotid [Bruit] : no bruit heard [Rt] : no varicose veins of the right leg [Lt] : no varicose veins of the left leg

## 2021-03-24 NOTE — DISCUSSION/SUMMARY
[FreeTextEntry1] : 35 year woman with a history as listed presents for an initial cardiac evaluation. \par Jaylin recent pregnancy was complicated by preeclampsia. She is still hypertensive on exam today. She likely has essential HTN. She has a tubal ligation. I will transition from labetalol to Norvasc 5mg Qday. She will monitor her BP q12 at home. She will lower her salt intake. \par Her EKG show anterior TWI which were previously seen but more prominent on her last ekg. She had an echo in 3/2021 that showed normal systolic LV function without any significant other findings, including no significant valvular disease. She will need a TMET in the future. \par Exercise and diet counseling was performed in order to reduce her future cardiovascular risk. \par She will followup with me in 2 months or sooner if necessary.

## 2021-06-18 ENCOUNTER — NON-APPOINTMENT (OUTPATIENT)
Age: 36
End: 2021-06-18

## 2021-06-18 ENCOUNTER — APPOINTMENT (OUTPATIENT)
Dept: CARDIOLOGY | Facility: CLINIC | Age: 36
End: 2021-06-18
Payer: COMMERCIAL

## 2021-06-18 VITALS
HEIGHT: 65 IN | WEIGHT: 179 LBS | OXYGEN SATURATION: 97 % | BODY MASS INDEX: 29.82 KG/M2 | SYSTOLIC BLOOD PRESSURE: 154 MMHG | DIASTOLIC BLOOD PRESSURE: 95 MMHG | HEART RATE: 87 BPM

## 2021-06-18 VITALS — SYSTOLIC BLOOD PRESSURE: 130 MMHG | DIASTOLIC BLOOD PRESSURE: 80 MMHG

## 2021-06-18 PROCEDURE — 93000 ELECTROCARDIOGRAM COMPLETE: CPT

## 2021-06-18 PROCEDURE — 99213 OFFICE O/P EST LOW 20 MIN: CPT

## 2021-06-18 PROCEDURE — 99072 ADDL SUPL MATRL&STAF TM PHE: CPT

## 2021-06-18 NOTE — PHYSICAL EXAM
[Well Groomed] : well groomed [General Appearance - In No Acute Distress] : no acute distress [Normal Conjunctiva] : the conjunctiva exhibited no abnormalities [Eyelids - No Xanthelasma] : the eyelids demonstrated no xanthelasmas [FreeTextEntry1] : dry MM [Normal Jugular Venous A Waves Present] : normal jugular venous A waves present [Normal Jugular Venous V Waves Present] : normal jugular venous V waves present [No Jugular Venous Magallanes A Waves] : no jugular venous magallanes A waves [Respiration, Rhythm And Depth] : normal respiratory rhythm and effort [Exaggerated Use Of Accessory Muscles For Inspiration] : no accessory muscle use [Auscultation Breath Sounds / Voice Sounds] : lungs were clear to auscultation bilaterally [Abdomen Soft] : soft [Abdomen Tenderness] : non-tender [Abdomen Mass (___ Cm)] : no abdominal mass palpated [Gait - Sufficient For Exercise Testing] : the gait was sufficient for exercise testing [Abnormal Walk] : normal gait [Nail Clubbing] : no clubbing of the fingernails [Cyanosis, Localized] : no localized cyanosis [Petechial Hemorrhages (___cm)] : no petechial hemorrhages [Skin Color & Pigmentation] : normal skin color and pigmentation [] : no rash [No Venous Stasis] : no venous stasis [Skin Lesions] : no skin lesions [No Skin Ulcers] : no skin ulcer [No Xanthoma] : no  xanthoma was observed [Oriented To Time, Place, And Person] : oriented to person, place, and time [Affect] : the affect was normal [Mood] : the mood was normal [No Anxiety] : not feeling anxious [Normal Rate] : normal [Rhythm Regular] : regular [Normal S1] : normal S1 [Normal S2] : normal S2 [No Gallop] : no gallop heard [No Murmur] : no murmurs heard [2+] : left 2+ [Right Carotid Bruit] : no bruit heard over the right carotid [Left Carotid Bruit] : no bruit heard over the left carotid [Bruit] : no bruit heard [No Pitting Edema] : no pitting edema present [Rt] : no varicose veins of the right leg [Lt] : no varicose veins of the left leg

## 2021-06-18 NOTE — HISTORY OF PRESENT ILLNESS
[FreeTextEntry1] : 35 year old woman with preeclampsia, s/p tubal ligation. \par She became pregnant with her 5th child which was delivered via c section because of possible preeclampsia and breached position of the baby.  She is now post partum 1 month and has been noted to intermittent hypertensin.\par \par She is here for a followup visit. \par She has noted that her BP at home ranged 120-130s/70-80s.  She notes that her BP is high when she gets anxious stressed.  She   denies any chest pain, PND, orthopnea, lower extremity edema, near syncope, syncope, strokelike symptoms. \par She is compliant with her medications. \par

## 2021-06-18 NOTE — CARDIOLOGY SUMMARY
[de-identified] : Sinus  Rhythm \par -RSR(V1) -nondiagnostic. \par  -  Nonspecific T-abnormality. \par  [de-identified] : 3/2021 normal systolic function.

## 2021-06-18 NOTE — DISCUSSION/SUMMARY
[FreeTextEntry1] : 35 year woman with a history as listed presents for an initial cardiac evaluation. \par Jaylin recent pregnancy was complicated by preeclampsia.  Her blood pressure has improved on Norvasc 5mg Qday. She will monitor her BP   at home. She will lower her salt intake. \par Her EKG show anterior TWI which were previously seen but more prominent on her last ekg. She had an echo in 3/2021 that showed normal systolic LV function without any significant other findings, including no significant valvular disease. She will need a TMET in the future once her back pain improves. \par Exercise and diet counseling was performed in order to reduce her future cardiovascular risk. \par She will followup with me in 3-4 months or sooner if necessary.

## 2021-06-19 ENCOUNTER — APPOINTMENT (OUTPATIENT)
Dept: DISASTER EMERGENCY | Facility: OTHER | Age: 36
End: 2021-06-19

## 2021-07-10 ENCOUNTER — APPOINTMENT (OUTPATIENT)
Dept: DISASTER EMERGENCY | Facility: OTHER | Age: 36
End: 2021-07-10

## 2021-08-02 ENCOUNTER — NON-APPOINTMENT (OUTPATIENT)
Age: 36
End: 2021-08-02

## 2021-08-02 ENCOUNTER — APPOINTMENT (OUTPATIENT)
Dept: ORTHOPEDIC SURGERY | Facility: CLINIC | Age: 36
End: 2021-08-02
Payer: COMMERCIAL

## 2021-08-02 VITALS
HEIGHT: 65 IN | WEIGHT: 185 LBS | DIASTOLIC BLOOD PRESSURE: 105 MMHG | HEART RATE: 62 BPM | SYSTOLIC BLOOD PRESSURE: 158 MMHG | BODY MASS INDEX: 30.82 KG/M2

## 2021-08-02 DIAGNOSIS — G56.80 OTHER SPECIFIED MONONEUROPATHIES OF UNSPECIFIED UPPER LIMB: ICD-10-CM

## 2021-08-02 DIAGNOSIS — M75.50 BURSITIS OF UNSPECIFIED SHOULDER: ICD-10-CM

## 2021-08-02 PROCEDURE — 99203 OFFICE O/P NEW LOW 30 MIN: CPT | Mod: 25

## 2021-08-02 PROCEDURE — 99072 ADDL SUPL MATRL&STAF TM PHE: CPT

## 2021-08-02 PROCEDURE — 20605 DRAIN/INJ JOINT/BURSA W/O US: CPT

## 2021-08-18 ENCOUNTER — APPOINTMENT (OUTPATIENT)
Dept: ORTHOPEDIC SURGERY | Facility: CLINIC | Age: 36
End: 2021-08-18
Payer: COMMERCIAL

## 2021-08-18 VITALS
WEIGHT: 185 LBS | BODY MASS INDEX: 30.82 KG/M2 | DIASTOLIC BLOOD PRESSURE: 99 MMHG | SYSTOLIC BLOOD PRESSURE: 149 MMHG | HEIGHT: 65 IN | HEART RATE: 74 BPM

## 2021-08-18 PROCEDURE — 99072 ADDL SUPL MATRL&STAF TM PHE: CPT

## 2021-08-18 PROCEDURE — 99204 OFFICE O/P NEW MOD 45 MIN: CPT
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
## 2021-08-18 NOTE — PHYSICAL EXAM
[Ataxic] : not ataxic [de-identified] : Examination of the cervical spine reveals no midline or paraspinal tenderness to palpation. No cervical lymphadenopathy. Decreased range of motion with respect to flexion, extension, rotation, and lateral bending. Negative Spurlings. Negative Lhermitte's.  She does have pain with range of motion of her left shoulder with evidence of impingement. No instability of bilateral upper extremities.  Cranial nerves II through XII grossly intact. Intact sensation bilateral upper extremities.  Grossly preserved strength with exception of 4+ out of 5 left upper extremity strength. 1+ biceps triceps and brachioradialis reflexes. Negative Lott's. 2+ radial pulse. Negative Tinel's over the cubital and carpal tunnel. No skin lesions on the right and left upper extremities. [de-identified] : Review of her cervical spine MRI reveals mild disc degeneration.  No significant neurocompression.

## 2021-11-04 ENCOUNTER — APPOINTMENT (OUTPATIENT)
Dept: INTERNAL MEDICINE | Facility: CLINIC | Age: 36
End: 2021-11-04
Payer: COMMERCIAL

## 2021-11-04 VITALS
OXYGEN SATURATION: 100 % | HEIGHT: 65 IN | RESPIRATION RATE: 16 BRPM | HEART RATE: 67 BPM | WEIGHT: 181 LBS | TEMPERATURE: 97.9 F | DIASTOLIC BLOOD PRESSURE: 87 MMHG | BODY MASS INDEX: 30.16 KG/M2 | SYSTOLIC BLOOD PRESSURE: 123 MMHG

## 2021-11-04 PROCEDURE — 99395 PREV VISIT EST AGE 18-39: CPT | Mod: 25

## 2021-11-04 PROCEDURE — 90686 IIV4 VACC NO PRSV 0.5 ML IM: CPT

## 2021-11-04 PROCEDURE — G0008: CPT

## 2021-11-04 RX ORDER — BLOOD-GLUCOSE METER
KIT MISCELLANEOUS 4 TIMES DAILY
Qty: 4 | Refills: 1 | Status: COMPLETED | COMMUNITY
Start: 2021-01-20 | End: 2021-11-04

## 2021-11-04 RX ORDER — LANCETS 33 GAUGE
EACH MISCELLANEOUS
Qty: 4 | Refills: 1 | Status: COMPLETED | COMMUNITY
Start: 2021-01-20 | End: 2021-11-04

## 2021-11-04 RX ORDER — BLOOD-GLUCOSE METER
W/DEVICE KIT MISCELLANEOUS
Qty: 1 | Refills: 0 | Status: COMPLETED | COMMUNITY
Start: 2021-01-20 | End: 2021-11-04

## 2021-11-11 LAB
ALBUMIN SERPL ELPH-MCNC: 4.5 G/DL
ALP BLD-CCNC: 89 U/L
ALT SERPL-CCNC: 22 U/L
ANION GAP SERPL CALC-SCNC: 14 MMOL/L
AST SERPL-CCNC: 20 U/L
BASOPHILS # BLD AUTO: 0.05 K/UL
BASOPHILS NFR BLD AUTO: 0.7 %
BILIRUB SERPL-MCNC: 0.4 MG/DL
BUN SERPL-MCNC: 7 MG/DL
CALCIUM SERPL-MCNC: 9.4 MG/DL
CHLORIDE SERPL-SCNC: 103 MMOL/L
CHOLEST SERPL-MCNC: 207 MG/DL
CO2 SERPL-SCNC: 21 MMOL/L
CREAT SERPL-MCNC: 0.78 MG/DL
EOSINOPHIL # BLD AUTO: 0.14 K/UL
EOSINOPHIL NFR BLD AUTO: 2 %
ESTIMATED AVERAGE GLUCOSE: 114 MG/DL
GLUCOSE SERPL-MCNC: 108 MG/DL
HBA1C MFR BLD HPLC: 5.6 %
HCT VFR BLD CALC: 42.8 %
HDLC SERPL-MCNC: 62 MG/DL
HGB BLD-MCNC: 13.8 G/DL
IMM GRANULOCYTES NFR BLD AUTO: 0.1 %
LDLC SERPL CALC-MCNC: 117 MG/DL
LYMPHOCYTES # BLD AUTO: 2.35 K/UL
LYMPHOCYTES NFR BLD AUTO: 34.4 %
MAN DIFF?: NORMAL
MCHC RBC-ENTMCNC: 27.2 PG
MCHC RBC-ENTMCNC: 32.2 GM/DL
MCV RBC AUTO: 84.4 FL
MONOCYTES # BLD AUTO: 0.42 K/UL
MONOCYTES NFR BLD AUTO: 6.1 %
NEUTROPHILS # BLD AUTO: 3.86 K/UL
NEUTROPHILS NFR BLD AUTO: 56.7 %
NONHDLC SERPL-MCNC: 145 MG/DL
PLATELET # BLD AUTO: 472 K/UL
POTASSIUM SERPL-SCNC: 4.1 MMOL/L
PROT SERPL-MCNC: 8 G/DL
RBC # BLD: 5.07 M/UL
RBC # FLD: 13.6 %
SODIUM SERPL-SCNC: 138 MMOL/L
TRIGL SERPL-MCNC: 140 MG/DL
TSH SERPL-ACNC: 1.12 UIU/ML
WBC # FLD AUTO: 6.83 K/UL

## 2021-11-22 DIAGNOSIS — T78.40XA ALLERGY, UNSPECIFIED, INITIAL ENCOUNTER: ICD-10-CM

## 2021-12-07 NOTE — PROVIDER CONTACT NOTE (OTHER) - ACTION/TREATMENT ORDERED:
NP stated to reassess (upon reassessment 99.2 temp orally), and to draw blood cultures. [facility] ontinue to monitor for safety and comfort
NP stated she will write a one time dose of Tylenol for headache pain. Will continue to monitor for safety and comfort
frequents fall & dizziness/Other

## 2021-12-09 ENCOUNTER — APPOINTMENT (OUTPATIENT)
Dept: MRI IMAGING | Facility: CLINIC | Age: 36
End: 2021-12-09
Payer: COMMERCIAL

## 2021-12-09 ENCOUNTER — OUTPATIENT (OUTPATIENT)
Dept: OUTPATIENT SERVICES | Facility: HOSPITAL | Age: 36
LOS: 1 days | End: 2021-12-09
Payer: COMMERCIAL

## 2021-12-09 DIAGNOSIS — Z00.00 ENCOUNTER FOR GENERAL ADULT MEDICAL EXAMINATION WITHOUT ABNORMAL FINDINGS: ICD-10-CM

## 2021-12-09 PROCEDURE — 72141 MRI NECK SPINE W/O DYE: CPT

## 2021-12-09 PROCEDURE — 72141 MRI NECK SPINE W/O DYE: CPT | Mod: 26

## 2021-12-13 ENCOUNTER — APPOINTMENT (OUTPATIENT)
Dept: ALLERGY | Facility: CLINIC | Age: 36
End: 2021-12-13
Payer: COMMERCIAL

## 2021-12-13 VITALS
BODY MASS INDEX: 29.99 KG/M2 | TEMPERATURE: 99.1 F | RESPIRATION RATE: 14 BRPM | OXYGEN SATURATION: 98 % | WEIGHT: 180 LBS | HEART RATE: 66 BPM | HEIGHT: 65 IN | SYSTOLIC BLOOD PRESSURE: 120 MMHG | DIASTOLIC BLOOD PRESSURE: 85 MMHG

## 2021-12-13 PROCEDURE — 99204 OFFICE O/P NEW MOD 45 MIN: CPT

## 2021-12-15 ENCOUNTER — NON-APPOINTMENT (OUTPATIENT)
Age: 36
End: 2021-12-15

## 2022-01-10 ENCOUNTER — APPOINTMENT (OUTPATIENT)
Dept: ORTHOPEDIC SURGERY | Facility: CLINIC | Age: 37
End: 2022-01-10
Payer: COMMERCIAL

## 2022-01-10 DIAGNOSIS — M54.12 RADICULOPATHY, CERVICAL REGION: ICD-10-CM

## 2022-01-10 DIAGNOSIS — M50.30 OTHER CERVICAL DISC DEGENERATION, UNSPECIFIED CERVICAL REGION: ICD-10-CM

## 2022-01-10 PROCEDURE — 99214 OFFICE O/P EST MOD 30 MIN: CPT

## 2022-01-10 PROCEDURE — 99072 ADDL SUPL MATRL&STAF TM PHE: CPT
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
## 2022-01-10 NOTE — DISCUSSION/SUMMARY
[de-identified] : We discussed further treatment options.  Reviewed her imaging.  I explained to her that her MRI does not reveal any significant neurocompression.  I recommended further work-up with neurology and the shoulder surgeon.  She will let me know of any changes or worsening of her symptoms.

## 2022-01-10 NOTE — PHYSICAL EXAM
[Ataxic] : not ataxic [de-identified] : Examination of the cervical spine reveals no midline or paraspinal tenderness to palpation. No cervical lymphadenopathy. Decreased range of motion with respect to flexion, extension, rotation, and lateral bending. Negative Spurlings. Negative Lhermitte's.  She does have pain with range of motion of her left shoulder with evidence of impingement. No instability of bilateral upper extremities.  Cranial nerves II through XII grossly intact. Intact sensation bilateral upper extremities.  Grossly preserved strength with exception of 4+ out of 5 left upper extremity strength. 1+ biceps triceps and brachioradialis reflexes. Negative Lott's. 2+ radial pulse. Negative Tinel's over the cubital and carpal tunnel. No skin lesions on the right and left upper extremities. [de-identified] : Review of her cervical spine MRI reveals mild disc degeneration.  No significant neurocompression.

## 2022-01-12 ENCOUNTER — APPOINTMENT (OUTPATIENT)
Dept: CARDIOLOGY | Facility: CLINIC | Age: 37
End: 2022-01-12
Payer: COMMERCIAL

## 2022-01-12 ENCOUNTER — NON-APPOINTMENT (OUTPATIENT)
Age: 37
End: 2022-01-12

## 2022-01-12 VITALS
DIASTOLIC BLOOD PRESSURE: 89 MMHG | SYSTOLIC BLOOD PRESSURE: 134 MMHG | HEART RATE: 76 BPM | OXYGEN SATURATION: 99 % | WEIGHT: 181 LBS | HEIGHT: 65 IN | BODY MASS INDEX: 30.16 KG/M2

## 2022-01-12 VITALS — DIASTOLIC BLOOD PRESSURE: 70 MMHG | SYSTOLIC BLOOD PRESSURE: 118 MMHG

## 2022-01-12 PROCEDURE — 93000 ELECTROCARDIOGRAM COMPLETE: CPT

## 2022-01-12 PROCEDURE — 99214 OFFICE O/P EST MOD 30 MIN: CPT

## 2022-01-12 NOTE — CARDIOLOGY SUMMARY
[de-identified] : Sinus  Rhythm \par -RSR(V1) -nondiagnostic. \par  -  Nonspecific T-abnormality. \par  [de-identified] : 3/2021 normal systolic function.

## 2022-01-12 NOTE — PHYSICAL EXAM
[Well Groomed] : well groomed [General Appearance - In No Acute Distress] : no acute distress [Normal Conjunctiva] : the conjunctiva exhibited no abnormalities [Eyelids - No Xanthelasma] : the eyelids demonstrated no xanthelasmas [FreeTextEntry1] : dry MM [Normal Jugular Venous A Waves Present] : normal jugular venous A waves present [Normal Jugular Venous V Waves Present] : normal jugular venous V waves present [No Jugular Venous Magallanes A Waves] : no jugular venous magallanes A waves [Respiration, Rhythm And Depth] : normal respiratory rhythm and effort [Exaggerated Use Of Accessory Muscles For Inspiration] : no accessory muscle use [Auscultation Breath Sounds / Voice Sounds] : lungs were clear to auscultation bilaterally [Abdomen Soft] : soft [Abdomen Tenderness] : non-tender [Abdomen Mass (___ Cm)] : no abdominal mass palpated [Abnormal Walk] : normal gait [Gait - Sufficient For Exercise Testing] : the gait was sufficient for exercise testing [Nail Clubbing] : no clubbing of the fingernails [Cyanosis, Localized] : no localized cyanosis [Petechial Hemorrhages (___cm)] : no petechial hemorrhages [Skin Color & Pigmentation] : normal skin color and pigmentation [] : no rash [No Venous Stasis] : no venous stasis [Skin Lesions] : no skin lesions [No Skin Ulcers] : no skin ulcer [No Xanthoma] : no  xanthoma was observed [Oriented To Time, Place, And Person] : oriented to person, place, and time [Affect] : the affect was normal [Mood] : the mood was normal [No Anxiety] : not feeling anxious [Normal Rate] : normal [Rhythm Regular] : regular [Normal S1] : normal S1 [Normal S2] : normal S2 [No Gallop] : no gallop heard [No Murmur] : no murmurs heard [2+] : left 2+ [Right Carotid Bruit] : no bruit heard over the right carotid [Left Carotid Bruit] : no bruit heard over the left carotid [Bruit] : no bruit heard [No Pitting Edema] : no pitting edema present [Rt] : no varicose veins of the right leg [Lt] : no varicose veins of the left leg

## 2022-01-12 NOTE — DISCUSSION/SUMMARY
[FreeTextEntry1] : 36 year woman with a history as listed presents for a followup cardiac evaluation. \par Jaylin si relatively doing well. She denies any anginal symptoms. Clinically she is euvolemic on exam.  Her blood pressure has improved on Norvasc 5mg Qday. She will monitor her BP   at home. She will lower her salt intake. \par Her EKG show anterior TWI which were previously seen but more prominent on her last ekg. She had an echo in 3/2021 that showed normal systolic LV function without any significant other findings, including no significant valvular disease. She will need a TMET in the future once her back pain improves. \par Exercise and diet counseling was performed in order to reduce her future cardiovascular risk. \par She will followup with me in 4-6 months or sooner if necessary.

## 2022-01-13 ENCOUNTER — APPOINTMENT (OUTPATIENT)
Dept: DERMATOLOGY | Facility: CLINIC | Age: 37
End: 2022-01-13
Payer: COMMERCIAL

## 2022-01-13 PROCEDURE — 99204 OFFICE O/P NEW MOD 45 MIN: CPT

## 2022-01-20 ENCOUNTER — APPOINTMENT (OUTPATIENT)
Dept: ORTHOPEDIC SURGERY | Facility: CLINIC | Age: 37
End: 2022-01-20
Payer: COMMERCIAL

## 2022-01-20 VITALS
BODY MASS INDEX: 29.99 KG/M2 | HEART RATE: 76 BPM | SYSTOLIC BLOOD PRESSURE: 118 MMHG | HEIGHT: 65 IN | WEIGHT: 180 LBS | DIASTOLIC BLOOD PRESSURE: 70 MMHG

## 2022-01-20 DIAGNOSIS — M67.912 UNSPECIFIED DISORDER OF SYNOVIUM AND TENDON, LEFT SHOULDER: ICD-10-CM

## 2022-01-20 PROCEDURE — 99072 ADDL SUPL MATRL&STAF TM PHE: CPT

## 2022-01-20 PROCEDURE — 99213 OFFICE O/P EST LOW 20 MIN: CPT

## 2022-01-25 NOTE — PHYSICAL EXAM
[Alert] : alert [Well Nourished] : well nourished [Healthy Appearance] : healthy appearance [No Acute Distress] : no acute distress [Well Developed] : well developed [Normal Voice/Communication] : normal voice communication [No Neck Mass] : no neck mass was observed [No LAD] : no lymphadenopathy [No Thyroid Mass] : no thyroid mass [Supple] : the neck was supple [Normal Rate and Effort] : normal respiratory rhythm and effort [No Crackles] : no crackles [Normal Rate] : heart rate was normal  [Normal S1, S2] : normal S1 and S2 [No murmur] : no murmur [Regular Rhythm] : with a regular rhythm [Normal Cervical Lymph Nodes] : cervical [Skin Intact] : skin intact  [No Rash] : no rash [No clubbing] : no clubbing [No Edema] : no edema [No Cyanosis] : no cyanosis [Normal Mood] : mood was normal [Normal Affect] : affect was normal [Judgment and Insight Age Appropriate] : judgement and insight is age appropriate [Alert, Awake, Oriented as Age-Appropriate] : alert, awake, oriented as age appropriate [Wheezing] : no wheezing was heard

## 2022-01-25 NOTE — SOCIAL HISTORY
[Spouse/Partner] : spouse/partner [House] : [unfilled] lives in a house  [Cat] : cat [] :  [de-identified] : 5 children  [FreeTextEntry2] : Homemaker  [Smokers in Household] : there are no smokers in the home [de-identified] : x3

## 2022-01-25 NOTE — ASSESSMENT
[FreeTextEntry1] : Acute allergic reaction to ASA component in Excedrin.   Mrs. Borja has tolerated ibuprofen and Tylenol since this reaction with no allergic symptoms.   Going forward she has been advised to take only ibuprofen and Tylenol for pain relief and avoid ASA products and other antiinflammatory medications.

## 2022-01-25 NOTE — SOCIAL HISTORY
[Spouse/Partner] : spouse/partner [House] : [unfilled] lives in a house  [Cat] : cat [] :  [de-identified] : 5 children  [FreeTextEntry2] : Homemaker  [Smokers in Household] : there are no smokers in the home [de-identified] : x3

## 2022-01-25 NOTE — HISTORY OF PRESENT ILLNESS
[Asthma] : asthma [Allergic Rhinitis] : allergic rhinitis [Eczematous rashes] : eczematous rashes [Venom Reactions] : venom reactions [Food Allergies] : food allergies [de-identified] : Patient took Excedrin x 2 for HA two weeks ago - immediate head tingling - diffuse itching and than rash all over body - she took Benadryl x 2 - her symptoms gradually subsided over 1-2 hours - she was seen at Urgent Care but she left because of the wait.   She may have eaten french fries around the same time as Excedrin.   \par \par Patient tolerates Tylenol - and she has taken Advil in the past.   She took 2 Advil last night with no reaction.

## 2022-01-25 NOTE — CONSULT LETTER
[Dear  ___] : Dear  [unfilled], [Thank you for referring [unfilled] for consultation for _____] : Thank you for referring [unfilled] for consultation for [unfilled] [Please see my note below.] : Please see my note below. [Sincerely,] : Sincerely, [FreeTextEntry3] : Mitchell B. Boxer, M.D., FAAAAI\par [facility]\par \par Department of Allergy-Immunology\par [facility] \par [address] [place] [address]\par [place] [address]\par Tel:   [phone]\par Fax:  [phone]\par Email: [email or web address]

## 2022-01-25 NOTE — CONSULT LETTER
[Dear  ___] : Dear  [unfilled], [Thank you for referring [unfilled] for consultation for _____] : Thank you for referring [unfilled] for consultation for [unfilled] [Please see my note below.] : Please see my note below. [Sincerely,] : Sincerely, [FreeTextEntry3] : Mitchell B. Boxer, M.D., FAAAAI\par [facility]\par \par Department of Allergy-Immunology\par [facility] \par [address] [place] [address]\par [place] [address]\par Tel:   [phone]\par Fax:  [phone]\par Email: [email or web address]\par

## 2022-01-25 NOTE — HISTORY OF PRESENT ILLNESS
[Asthma] : asthma [Allergic Rhinitis] : allergic rhinitis [Eczematous rashes] : eczematous rashes [Venom Reactions] : venom reactions [Food Allergies] : food allergies [de-identified] : Patient took Excedrin x 2 for HA two weeks ago - immediate head tingling - diffuse itching and than rash all over body - she took Benadryl x 2 - her symptoms gradually subsided over 1-2 hours - she was seen at Urgent Care but she left because of the wait.   She may have eaten french fries around the same time as Excedrin.   \par \par Patient tolerates Tylenol - and she has taken Advil in the past.   She took 2 Advil last night with no reaction.

## 2022-01-28 PROBLEM — M67.912 TENDINOPATHY OF LEFT ROTATOR CUFF: Status: ACTIVE | Noted: 2022-01-28

## 2022-01-28 NOTE — ADDENDUM
[FreeTextEntry1] : This note was written by Rachana Obando on 01/20/2022 acting solely as a scribe for Dr. Anthony Begum.\par \par All medical record entries made by the Scribe were at my, Dr. Anthony Begum, direction and personally dictated by me on 01/20/2022. I have personally reviewed the chart and agree that the record accurately reflects my personal performance of the history, physical exam, assessment and plan.
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
## 2022-01-28 NOTE — DISCUSSION/SUMMARY
[de-identified] : 35 y/o female with left shoulder rotator cuff tendinopathy\par \par Presents for evaluation of chronic left shoulder pain. Clinically, patient has some stiffness to the shoulder and some tenderness to the posterior shoulder consistent with MRI imaging that shows some rotator cuff tendinopathy. She has been evaluated by Dr. Figueroa who referred the patient to my care for further consideration of her left shoulder.  Review of the patient's MRI shows no significant internal derangement that would warrant any further treatment or has direct correlation with the patient's current symptoms.  It is unclear as to whether the shoulder has any role in her current posterior periscapular tenderness.\par \par Recommendations: Begin trial of PT, Rx given.  Activity modifications.  OTC NSAID's or acetaminophen as tolerated, with application of ice/heat to the area 2-3x daily for 20 minutes after periods of activity. \par \par Follow-up as needed.  Pain management referral.

## 2022-01-28 NOTE — PHYSICAL EXAM
[de-identified] : Oriented to time, place, person\par Mood: Normal\par Affect: Normal\par Appearance: Healthy, well appearing, no acute distress.\par Gait: Normal\par Assistive Devices: None\par \par Left shoulder exam:\par \par Inspection: No malalignment, No defects, No atrophy\par Skin: No masses, No lesions\par Neck: Negative Spurling, full ROM, no pain with ROM\par AROM: FF to 150, abduction to 70, ER to 70, IR to upper lumbar\par Painful arc ROM: Pain with terminal motion\par Tenderness: +posterior shoulder pain, +tenderness over scapular spine. No bicipital tenderness, no tenderness to greater tuberosity/RTC insertion, no anterior shoulder/lesser tuberosity tenderness\par Strength: 5/5 ER, 5/5 IR in adduction, 4+/5 supraspinatus testing, negative Goliad's test\par AC joint: No TTP/pain with cross arm testing\par Biceps: Speed Negative, Yergason Negative \par Impingement test: Negative Nieto, + Neer\par Vasc: 2+ radial pulse \par Stability: Stable \par Neuro: AIN, PIN, Ulnar nerve intact to motor\par Sensation: Intact to light touch throughout  [de-identified] : MRI left shoulder dated 5.12.2021 shows mild RTC tendinopathy.

## 2022-02-10 ENCOUNTER — OUTPATIENT (OUTPATIENT)
Dept: OUTPATIENT SERVICES | Facility: HOSPITAL | Age: 37
LOS: 1 days | End: 2022-02-10
Payer: COMMERCIAL

## 2022-02-10 ENCOUNTER — APPOINTMENT (OUTPATIENT)
Dept: MRI IMAGING | Facility: CLINIC | Age: 37
End: 2022-02-10
Payer: COMMERCIAL

## 2022-02-10 DIAGNOSIS — Z00.8 ENCOUNTER FOR OTHER GENERAL EXAMINATION: ICD-10-CM

## 2022-02-10 PROCEDURE — 70553 MRI BRAIN STEM W/O & W/DYE: CPT

## 2022-02-10 PROCEDURE — 70553 MRI BRAIN STEM W/O & W/DYE: CPT | Mod: 26

## 2022-02-10 PROCEDURE — A9585: CPT

## 2022-02-11 ENCOUNTER — APPOINTMENT (OUTPATIENT)
Dept: NEUROLOGY | Facility: CLINIC | Age: 37
End: 2022-02-11
Payer: COMMERCIAL

## 2022-02-11 VITALS
DIASTOLIC BLOOD PRESSURE: 91 MMHG | RESPIRATION RATE: 15 BRPM | SYSTOLIC BLOOD PRESSURE: 132 MMHG | HEIGHT: 65 IN | HEART RATE: 73 BPM | BODY MASS INDEX: 29.99 KG/M2 | WEIGHT: 180 LBS

## 2022-02-11 DIAGNOSIS — M62.81 MUSCLE WEAKNESS (GENERALIZED): ICD-10-CM

## 2022-02-11 DIAGNOSIS — R20.0 ANESTHESIA OF SKIN: ICD-10-CM

## 2022-02-11 DIAGNOSIS — R29.898 OTHER SYMPTOMS AND SIGNS INVOLVING THE MUSCULOSKELETAL SYSTEM: ICD-10-CM

## 2022-02-11 PROCEDURE — 99204 OFFICE O/P NEW MOD 45 MIN: CPT

## 2022-02-11 PROCEDURE — 99072 ADDL SUPL MATRL&STAF TM PHE: CPT
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
## 2022-02-11 NOTE — DATA REVIEWED
[de-identified] : MRI brain 2/10/2022- reviewed images (report pending) seems normal on my review. \par MRI cervical spine 12/2021- normal

## 2022-02-11 NOTE — PHYSICAL EXAM
[FreeTextEntry1] : PHYSICAL EXAM\par Constitutional: Alert, no acute distress \par Neck: Full range of motion\par Psychiatric: appropriate affect and mood\par Pulmonary: No respiratory distress, stable on room air\par \par NEUROLOGICAL EXAM\par Mental status: The patient is alert, attentive, and oriented x 3.\par Speech/language: Clear and fluent.\par Cranial nerves:\par Pupil size equal and briskly reactive to light. \par EOMI, no nystagmus, no ptosis\par Facial sensation is intact to pinprick in all 3 divisions bilaterally.\par Face is symmetric with normal eye closure and smile.\par Hearing is normal to rubbing fingers\par Palate elevates symmetrically. Phonation is normal.\par Tongue is midline with normal movements and no atrophy.\par Motor: Muscle bulk and tone are normal. Strength is full bilaterally with give way weakness in all muscle groups of LUE\par Reflexes: Reflexes are 2+ and symmetric at the biceps, knees, and ankles. Plantar responses are flexor.\par Sensory: Reduced sensation to pinprick over dorsum of left forearm, intact in hand. Not following any particular dermatomal or peripheral nerve distribution\par Gait/Stance: Posture is normal. Gait is steady with normal steps, base, arm swing, and turning.\par \par \par \par

## 2022-04-25 RX ORDER — AMLODIPINE BESYLATE 5 MG/1
5 TABLET ORAL DAILY
Qty: 90 | Refills: 3 | Status: COMPLETED | COMMUNITY
Start: 2021-03-24 | End: 2023-04-20

## 2022-05-31 ENCOUNTER — APPOINTMENT (OUTPATIENT)
Dept: NEUROLOGY | Facility: CLINIC | Age: 37
End: 2022-05-31

## 2022-06-30 NOTE — OB PROVIDER TRIAGE NOTE - NS_FETALPRESSONO_OBGYN_ALL_OB
68 year old Male with PMHx ESRD s/p permacath removal 5/10/22 s/p Rt DDRT 4/21/22,  CVA (2019), Afib on apixaban, seizure activity, CAD s/p stents, CABG (2020), HTN, HLD, DM on insulin, gastric/duodenal ulcer, recent hospitalization 4/28/22 -5/10/22 with weakness/anemia found to have perinephric hematoma requiring evacuation and repair of bleeding arterial anastomosis who presented to [facility] for status epilepticus requiring intubation for hypoxic respiratory failure    Subsequently developed continued seizures and hypothermia  Was initiated on empiric antibiotics  s/p LP yesterday which was not suggestive of infectious process  Blood, urine and sputum cultures without positive sample  Would discontinue Vancomycin, Meropenem and Ganciclovir as infectious process less likely at this time    I will follow the patient as needed. Please feel free to contact me with any further questions or concerns.    Carlton Hoover M.D.  [facility] Division of Infectious Disease  8AM-5PM Monday - Friday: Available on [employer] Teams  After Hours and Holidays (or if no response on [employer] Teams): Please contact the Infectious Diseases Office at [phone]     The above assessment and plan were discussed with [facility] transplant surgery TAMMY Cephalic

## 2022-07-06 NOTE — DISCUSSION/SUMMARY
[de-identified] : We discussed further treatment options.  She appears to have cervical radiculopathy without obvious neural compressive pathology of her cervical spine.  She does report having an EMG which was reportedly negative.  She also has evidence of shoulder pathology.  Given her persistent symptoms I recommended an updated cervical spine MRI to evaluate for any changes which may be contributing to her symptoms.  I have also recommended following up with a shoulder specialist.  She will contemplate possible injection based therapies.  I have also recommended seeing neurology again for possible repeat EMG.  Follow-up after her MRI.
Yes

## 2022-08-03 ENCOUNTER — NON-APPOINTMENT (OUTPATIENT)
Age: 37
End: 2022-08-03

## 2022-08-03 ENCOUNTER — APPOINTMENT (OUTPATIENT)
Dept: CARDIOLOGY | Facility: CLINIC | Age: 37
End: 2022-08-03

## 2022-08-03 VITALS
OXYGEN SATURATION: 98 % | DIASTOLIC BLOOD PRESSURE: 106 MMHG | HEIGHT: 65 IN | SYSTOLIC BLOOD PRESSURE: 157 MMHG | WEIGHT: 173 LBS | BODY MASS INDEX: 28.82 KG/M2 | HEART RATE: 75 BPM

## 2022-08-03 VITALS — DIASTOLIC BLOOD PRESSURE: 78 MMHG | SYSTOLIC BLOOD PRESSURE: 134 MMHG

## 2022-08-03 DIAGNOSIS — O13.9 GESTATIONAL [PREGNANCY-INDUCED] HYPERTENSION W/OUT SIGNIFICANT PROTEINURIA, UNSPECIFIED TRIMESTER: ICD-10-CM

## 2022-08-03 PROCEDURE — 93000 ELECTROCARDIOGRAM COMPLETE: CPT

## 2022-08-03 PROCEDURE — 99214 OFFICE O/P EST MOD 30 MIN: CPT

## 2022-08-03 NOTE — CARDIOLOGY SUMMARY
[de-identified] : Sinus  Rhythm \par -RSR(V1) -nondiagnostic. \par  -  Nonspecific T-abnormality. \par  [de-identified] : 3/2021 normal systolic function.

## 2022-08-03 NOTE — HISTORY OF PRESENT ILLNESS
[FreeTextEntry1] : 35 year old woman with preeclampsia, s/p tubal ligation. \par She became pregnant with her 5th child which was delivered via c section because of possible preeclampsia and breached position of the baby.  She is now post partum 12 month and has been noted to intermittent hypertensin.\par \par She is here for a followup visit. \par Since her last visit  she is feeling relatively well. Her back pain has improved.   She   denies any chest pain, PND, orthopnea, lower extremity edema, near syncope, syncope, strokelike symptoms. She denies any blurry vision, headaches or recent stroke like symptoms. She has not been monitoring her diet. She has not been monitoring her diet. \par She is compliant with her medications. \par

## 2022-08-03 NOTE — DISCUSSION/SUMMARY
[FreeTextEntry1] : 36 year woman with a history as listed presents for a followup cardiac evaluation. \par Jaylin is relatively doing well. She denies any anginal symptoms. Clinically she is euvolemic on exam.  Her blood pressure has improved on Norvasc 5mg Qday. She will monitor her BP   at home. She will lower her salt intake. \par Her EKG show anterior TWI which were previously seen . She had an echo in 3/2021 that showed normal systolic LV function without any significant other findings, including no significant valvular disease. She will need a TMET in the future once her back pain improves. \par Exercise and diet counseling was performed in order to reduce her future cardiovascular risk. \par She will followup with me in 6 months or sooner if necessary.  [EKG obtained to assist in diagnosis and management of assessed problem(s)] : EKG obtained to assist in diagnosis and management of assessed problem(s)

## 2022-09-01 NOTE — ED ADULT NURSE NOTE - CAS EDN DISCHARGE ASSESSMENT
Called to schedule pts nuchal us. She will reach out to her dr to get auth   Alert and oriented to person, place and time

## 2022-11-07 ENCOUNTER — APPOINTMENT (OUTPATIENT)
Dept: INTERNAL MEDICINE | Facility: CLINIC | Age: 37
End: 2022-11-07

## 2022-11-07 VITALS
OXYGEN SATURATION: 98 % | TEMPERATURE: 97.7 F | HEIGHT: 65 IN | SYSTOLIC BLOOD PRESSURE: 136 MMHG | RESPIRATION RATE: 17 BRPM | WEIGHT: 173 LBS | DIASTOLIC BLOOD PRESSURE: 83 MMHG | BODY MASS INDEX: 28.82 KG/M2 | HEART RATE: 69 BPM

## 2022-11-07 DIAGNOSIS — Z23 ENCOUNTER FOR IMMUNIZATION: ICD-10-CM

## 2022-11-07 DIAGNOSIS — R10.84 GENERALIZED ABDOMINAL PAIN: ICD-10-CM

## 2022-11-07 DIAGNOSIS — Z87.19 PERSONAL HISTORY OF OTHER DISEASES OF THE DIGESTIVE SYSTEM: ICD-10-CM

## 2022-11-07 DIAGNOSIS — R73.01 IMPAIRED FASTING GLUCOSE: ICD-10-CM

## 2022-11-07 PROCEDURE — 90686 IIV4 VACC NO PRSV 0.5 ML IM: CPT

## 2022-11-07 PROCEDURE — 99395 PREV VISIT EST AGE 18-39: CPT | Mod: 25

## 2022-11-07 PROCEDURE — G0008: CPT

## 2022-11-07 RX ORDER — PNV NO.95/FERROUS FUM/FOLIC AC 28MG-0.8MG
TABLET ORAL
Refills: 0 | Status: DISCONTINUED | COMMUNITY
End: 2022-11-07

## 2022-11-15 LAB
ALBUMIN SERPL ELPH-MCNC: 4.5 G/DL
ALP BLD-CCNC: 89 U/L
ALT SERPL-CCNC: 24 U/L
ANION GAP SERPL CALC-SCNC: 10 MMOL/L
AST SERPL-CCNC: 24 U/L
BASOPHILS # BLD AUTO: 0.06 K/UL
BASOPHILS NFR BLD AUTO: 0.9 %
BILIRUB SERPL-MCNC: 0.4 MG/DL
BUN SERPL-MCNC: 9 MG/DL
CALCIUM SERPL-MCNC: 9.5 MG/DL
CHLORIDE SERPL-SCNC: 101 MMOL/L
CHOLEST SERPL-MCNC: 258 MG/DL
CO2 SERPL-SCNC: 27 MMOL/L
CREAT SERPL-MCNC: 0.76 MG/DL
EGFR: 104 ML/MIN/1.73M2
EOSINOPHIL # BLD AUTO: 0.23 K/UL
EOSINOPHIL NFR BLD AUTO: 3.6 %
ESTIMATED AVERAGE GLUCOSE: 123 MG/DL
GLUCOSE SERPL-MCNC: 111 MG/DL
HBA1C MFR BLD HPLC: 5.9 %
HCT VFR BLD CALC: 43 %
HDLC SERPL-MCNC: 65 MG/DL
HGB BLD-MCNC: 13.5 G/DL
IMM GRANULOCYTES NFR BLD AUTO: 0.3 %
LDLC SERPL CALC-MCNC: 152 MG/DL
LYMPHOCYTES # BLD AUTO: 2.78 K/UL
LYMPHOCYTES NFR BLD AUTO: 43.6 %
MAN DIFF?: NORMAL
MCHC RBC-ENTMCNC: 26.7 PG
MCHC RBC-ENTMCNC: 31.4 GM/DL
MCV RBC AUTO: 85 FL
MONOCYTES # BLD AUTO: 0.38 K/UL
MONOCYTES NFR BLD AUTO: 6 %
NEUTROPHILS # BLD AUTO: 2.91 K/UL
NEUTROPHILS NFR BLD AUTO: 45.6 %
NONHDLC SERPL-MCNC: 193 MG/DL
PLATELET # BLD AUTO: 492 K/UL
POTASSIUM SERPL-SCNC: 3.8 MMOL/L
PROT SERPL-MCNC: 8.2 G/DL
RBC # BLD: 5.06 M/UL
RBC # FLD: 14.4 %
SODIUM SERPL-SCNC: 139 MMOL/L
TRIGL SERPL-MCNC: 202 MG/DL
TSH SERPL-ACNC: 1.78 UIU/ML
WBC # FLD AUTO: 6.38 K/UL

## 2022-12-19 ENCOUNTER — TRANSCRIPTION ENCOUNTER (OUTPATIENT)
Age: 37
End: 2022-12-19

## 2023-02-01 ENCOUNTER — TRANSCRIPTION ENCOUNTER (OUTPATIENT)
Age: 38
End: 2023-02-01

## 2023-02-09 ENCOUNTER — APPOINTMENT (OUTPATIENT)
Dept: CARDIOLOGY | Facility: CLINIC | Age: 38
End: 2023-02-09
Payer: COMMERCIAL

## 2023-02-09 DIAGNOSIS — R94.31 ABNORMAL ELECTROCARDIOGRAM [ECG] [EKG]: ICD-10-CM

## 2023-02-09 PROCEDURE — 93015 CV STRESS TEST SUPVJ I&R: CPT

## 2023-02-17 ENCOUNTER — TRANSCRIPTION ENCOUNTER (OUTPATIENT)
Age: 38
End: 2023-02-17

## 2023-02-17 ENCOUNTER — APPOINTMENT (OUTPATIENT)
Dept: CARDIOLOGY | Facility: CLINIC | Age: 38
End: 2023-02-17
Payer: COMMERCIAL

## 2023-02-17 ENCOUNTER — NON-APPOINTMENT (OUTPATIENT)
Age: 38
End: 2023-02-17

## 2023-02-17 VITALS — DIASTOLIC BLOOD PRESSURE: 80 MMHG | SYSTOLIC BLOOD PRESSURE: 122 MMHG

## 2023-02-17 VITALS
SYSTOLIC BLOOD PRESSURE: 137 MMHG | HEIGHT: 65 IN | WEIGHT: 176 LBS | OXYGEN SATURATION: 97 % | BODY MASS INDEX: 29.32 KG/M2 | HEART RATE: 65 BPM | DIASTOLIC BLOOD PRESSURE: 87 MMHG

## 2023-02-17 PROCEDURE — 93000 ELECTROCARDIOGRAM COMPLETE: CPT

## 2023-02-17 PROCEDURE — 99214 OFFICE O/P EST MOD 30 MIN: CPT

## 2023-02-20 NOTE — CARDIOLOGY SUMMARY
[de-identified] : Sinus  Rhythm \par -RSR(V1) -nondiagnostic. \par  -  Nonspecific T-abnormality. \par  [de-identified] : 2/2023\par exercise\par 8 METS\par max b/p 182/100 [de-identified] : 3/2021 normal systolic function.

## 2023-02-20 NOTE — END OF VISIT
[FreeTextEntry3] : I saw and evaluated the patient and discussed the care with the NP provider above on 02/17/2023 . I agree with the findings and plan as documented in the note above. She will continue her meds. I personally reviewed all of the hospital records available to me at this time, which included but are not limited to the discharge summary, labs and imaging reports. Exercise and diet counseling was performed in order to reduce her future cardiovascular risk.

## 2023-02-20 NOTE — HISTORY OF PRESENT ILLNESS
[FreeTextEntry1] : 37 year old woman with preeclampsia, s/p tubal ligation. \par She became pregnant with her 5th child which was delivered via c section because of possible preeclampsia and breached position of the baby.  She was 12 months post partum at our initial visit and had been noted to have intermittent hypertensin.\par \par She is here for a followup visit.  She was last seen in 8/2022. \par Since her last visit  she is feeling relatively well.  She has been very busy with her children. HEr blood pressure readings at home range between 139/80 - 116/68.\par \par She   denies any chest pain, PND, orthopnea, lower extremity edema, near syncope, syncope, strokelike symptoms. \par She denies any blurry vision, headaches or recent stroke like symptoms. She has not been monitoring her diet. She has not been monitoring her diet. She is trying to become more active.  She has an elliptical at home.\par  \par She is compliant with her medications. \par

## 2023-02-20 NOTE — DISCUSSION/SUMMARY
[EKG obtained to assist in diagnosis and management of assessed problem(s)] : EKG obtained to assist in diagnosis and management of assessed problem(s) [FreeTextEntry1] : 37 year woman with a history as listed presents for a followup cardiac evaluation. \par Jaylin is relatively doing well. She denies any anginal symptoms. Clinically she is euvolemic on exam.  Her blood pressure has improved on Norvasc 5mg Qday. She will continue to monitor her BP at home. \par \par She had an echo in 3/2021 that showed normal systolic LV function without any significant other findings, including no significant valvular disease. Exercise stress testing was overall eventful, her blood pressure was a bit elevated.  She will continue norvasc 5mg daily.\par \par Exercise and diet counseling was performed in order to reduce her future cardiovascular risk. She will attempt intermittent fasting. \par She will followup with me in 6 months or sooner if necessary.

## 2023-02-20 NOTE — PHYSICAL EXAM
[Well Groomed] : well groomed [General Appearance - In No Acute Distress] : no acute distress [Eyelids - No Xanthelasma] : the eyelids demonstrated no xanthelasmas [Normal Jugular Venous A Waves Present] : normal jugular venous A waves present [Normal Jugular Venous V Waves Present] : normal jugular venous V waves present [No Jugular Venous Magallanes A Waves] : no jugular venous magallanes A waves [Respiration, Rhythm And Depth] : normal respiratory rhythm and effort [Exaggerated Use Of Accessory Muscles For Inspiration] : no accessory muscle use [Auscultation Breath Sounds / Voice Sounds] : lungs were clear to auscultation bilaterally [Abdomen Soft] : soft [Abdomen Tenderness] : non-tender [Abdomen Mass (___ Cm)] : no abdominal mass palpated [Abnormal Walk] : normal gait [Gait - Sufficient For Exercise Testing] : the gait was sufficient for exercise testing [Nail Clubbing] : no clubbing of the fingernails [Cyanosis, Localized] : no localized cyanosis [Petechial Hemorrhages (___cm)] : no petechial hemorrhages [Skin Color & Pigmentation] : normal skin color and pigmentation [] : no rash [No Venous Stasis] : no venous stasis [Skin Lesions] : no skin lesions [No Skin Ulcers] : no skin ulcer [No Xanthoma] : no  xanthoma was observed [Oriented To Time, Place, And Person] : oriented to person, place, and time [Affect] : the affect was normal [Mood] : the mood was normal [No Anxiety] : not feeling anxious [Normal Rate] : normal [2+] : left 2+ [Well Developed] : well developed [Well Nourished] : well nourished [No Acute Distress] : no acute distress [Normal Conjunctiva] : normal conjunctiva [Normal Venous Pressure] : normal venous pressure [No Carotid Bruit] : no carotid bruit [Normal S1, S2] : normal S1, S2 [No Rub] : no rub [No Gallop] : no gallop [Rhythm Regular] : regular [Normal S1] : normal S1 [Normal S2] : normal S2 [No Murmur] : no murmurs heard [No Pitting Edema] : no pitting edema present [No Abnormalities] : the abdominal aorta was not enlarged and no bruit was heard [Clear Lung Fields] : clear lung fields [Good Air Entry] : good air entry [No Respiratory Distress] : no respiratory distress  [Soft] : abdomen soft [Non Tender] : non-tender [No Masses/organomegaly] : no masses/organomegaly [Normal Bowel Sounds] : normal bowel sounds [Normal Gait] : normal gait [No Edema] : no edema [No Cyanosis] : no cyanosis [No Clubbing] : no clubbing [No Varicosities] : no varicosities [No Rash] : no rash [No Skin Lesions] : no skin lesions [Moves all extremities] : moves all extremities [No Focal Deficits] : no focal deficits [Normal Speech] : normal speech [Alert and Oriented] : alert and oriented [Normal memory] : normal memory [FreeTextEntry1] : dry MM [Right Carotid Bruit] : no bruit heard over the right carotid [Left Carotid Bruit] : no bruit heard over the left carotid [Bruit] : no bruit heard [Rt] : no varicose veins of the right leg [Lt] : no varicose veins of the left leg

## 2023-02-21 ENCOUNTER — APPOINTMENT (OUTPATIENT)
Dept: INTERNAL MEDICINE | Facility: CLINIC | Age: 38
End: 2023-02-21
Payer: COMMERCIAL

## 2023-02-21 PROCEDURE — 99214 OFFICE O/P EST MOD 30 MIN: CPT | Mod: 95

## 2023-02-28 ENCOUNTER — TRANSCRIPTION ENCOUNTER (OUTPATIENT)
Age: 38
End: 2023-02-28

## 2023-03-12 PROBLEM — F41.9 ANXIETY: Status: ACTIVE | Noted: 2023-02-21

## 2023-03-14 ENCOUNTER — APPOINTMENT (OUTPATIENT)
Dept: GASTROENTEROLOGY | Facility: CLINIC | Age: 38
End: 2023-03-14
Payer: COMMERCIAL

## 2023-03-14 VITALS
HEIGHT: 65 IN | SYSTOLIC BLOOD PRESSURE: 141 MMHG | DIASTOLIC BLOOD PRESSURE: 95 MMHG | BODY MASS INDEX: 28.66 KG/M2 | WEIGHT: 172 LBS | OXYGEN SATURATION: 99 % | HEART RATE: 77 BPM

## 2023-03-14 DIAGNOSIS — R10.13 EPIGASTRIC PAIN: ICD-10-CM

## 2023-03-14 DIAGNOSIS — K44.9 DIAPHRAGMATIC HERNIA W/OUT OBSTRUCTION OR GANGRENE: ICD-10-CM

## 2023-03-14 DIAGNOSIS — F41.9 ANXIETY DISORDER, UNSPECIFIED: ICD-10-CM

## 2023-03-14 PROCEDURE — 99214 OFFICE O/P EST MOD 30 MIN: CPT

## 2023-03-14 RX ORDER — ESCITALOPRAM OXALATE 5 MG/1
5 TABLET ORAL DAILY
Qty: 30 | Refills: 3 | Status: DISCONTINUED | COMMUNITY
Start: 2023-02-21 | End: 2023-03-14
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
## 2023-03-14 NOTE — ASSESSMENT
[FreeTextEntry1] : IMPRESSION: \par #  History of abdominal pain \par - Pressure pain in epigastric area (3/10 on pain scale) - intermittent - since past couple of years - perhaps since 2 years - lasting for a few min - perhaps occurs after eating. \par -  Concerned about hiatal hernia\par -  Concerned about GB stones\par -  NSAID use \par - EGD on 5/2019:  Small hiatal hernia s/p bx of GEJ (neg path).  Nml gastric mucosa s/p bx (neg for HP and IM).  Nml duodenum s/p bx(neg for celiac disease). \par -  MRI/MRCP:  3/2018:  Gallstones \par \par #  Comorbidities:  Anxiety, HTN\par \par \par PLAN \par Abdominal ultrasound to r/o gallstones - she had know gallstones in 2018 - contact information given of general surgeon.  \par Upper GI series to assess for hiatal hernia \par Discussed an upper endoscopy to r/o PUD - risks/benefits/alternatives discussed\par Blood test\par Avoid NSAIDs\par H. pylori breath \par Trial of PPI therapy \par Follow up in 3 months \par \par

## 2023-03-14 NOTE — PHYSICAL EXAM
[Bowel Sounds] : normal bowel sounds [Abdomen Tenderness] : non-tender [No Masses] : no abdominal mass palpated [Abdomen Soft] : soft [] : no hepatosplenomegaly [Abnormal Walk] : normal gait [No Clubbing, Cyanosis] : no clubbing or cyanosis of the fingernails [Normal] : oriented to person, place, and time

## 2023-03-17 ENCOUNTER — NON-APPOINTMENT (OUTPATIENT)
Age: 38
End: 2023-03-17

## 2023-03-17 LAB
ALBUMIN SERPL ELPH-MCNC: 4.6 G/DL
ALP BLD-CCNC: 83 U/L
ALT SERPL-CCNC: 21 U/L
ANION GAP SERPL CALC-SCNC: 12 MMOL/L
AST SERPL-CCNC: 19 U/L
BASOPHILS # BLD AUTO: 0.04 K/UL
BASOPHILS NFR BLD AUTO: 0.6 %
BILIRUB SERPL-MCNC: 0.2 MG/DL
BUN SERPL-MCNC: 9 MG/DL
CALCIUM SERPL-MCNC: 9.3 MG/DL
CHLORIDE SERPL-SCNC: 103 MMOL/L
CO2 SERPL-SCNC: 23 MMOL/L
CREAT SERPL-MCNC: 0.8 MG/DL
EGFR: 98 ML/MIN/1.73M2
EOSINOPHIL # BLD AUTO: 0.23 K/UL
EOSINOPHIL NFR BLD AUTO: 3.3 %
GLUCOSE SERPL-MCNC: 115 MG/DL
HCT VFR BLD CALC: 39.4 %
HGB BLD-MCNC: 13.1 G/DL
IMM GRANULOCYTES NFR BLD AUTO: 0.3 %
LPL SERPL-CCNC: 82 U/L
LYMPHOCYTES # BLD AUTO: 2.47 K/UL
LYMPHOCYTES NFR BLD AUTO: 35.5 %
MAN DIFF?: NORMAL
MCHC RBC-ENTMCNC: 26.5 PG
MCHC RBC-ENTMCNC: 33.2 GM/DL
MCV RBC AUTO: 79.6 FL
MONOCYTES # BLD AUTO: 0.45 K/UL
MONOCYTES NFR BLD AUTO: 6.5 %
NEUTROPHILS # BLD AUTO: 3.74 K/UL
NEUTROPHILS NFR BLD AUTO: 53.8 %
PLATELET # BLD AUTO: 475 K/UL
POTASSIUM SERPL-SCNC: 4.2 MMOL/L
PROT SERPL-MCNC: 8.4 G/DL
RBC # BLD: 4.95 M/UL
RBC # FLD: 13.9 %
SODIUM SERPL-SCNC: 138 MMOL/L
UREA BREATH TEST QL: NEGATIVE
WBC # FLD AUTO: 6.95 K/UL

## 2023-03-18 ENCOUNTER — APPOINTMENT (OUTPATIENT)
Dept: ULTRASOUND IMAGING | Facility: IMAGING CENTER | Age: 38
End: 2023-03-18
Payer: COMMERCIAL

## 2023-03-18 ENCOUNTER — OUTPATIENT (OUTPATIENT)
Dept: OUTPATIENT SERVICES | Facility: HOSPITAL | Age: 38
LOS: 1 days | End: 2023-03-18
Payer: COMMERCIAL

## 2023-03-18 DIAGNOSIS — R10.13 EPIGASTRIC PAIN: ICD-10-CM

## 2023-03-18 DIAGNOSIS — Z00.8 ENCOUNTER FOR OTHER GENERAL EXAMINATION: ICD-10-CM

## 2023-03-18 PROCEDURE — 76700 US EXAM ABDOM COMPLETE: CPT

## 2023-03-18 PROCEDURE — 76700 US EXAM ABDOM COMPLETE: CPT | Mod: 26

## 2023-03-22 ENCOUNTER — NON-APPOINTMENT (OUTPATIENT)
Age: 38
End: 2023-03-22

## 2023-03-23 ENCOUNTER — APPOINTMENT (OUTPATIENT)
Dept: DERMATOLOGY | Facility: CLINIC | Age: 38
End: 2023-03-23
Payer: COMMERCIAL

## 2023-03-23 DIAGNOSIS — L82.1 OTHER SEBORRHEIC KERATOSIS: ICD-10-CM

## 2023-03-23 PROCEDURE — 99214 OFFICE O/P EST MOD 30 MIN: CPT | Mod: GC

## 2023-03-23 RX ORDER — TRETINOIN 0.25 MG/G
0.03 CREAM TOPICAL
Qty: 1 | Refills: 11 | Status: ACTIVE | COMMUNITY
Start: 2019-01-08 | End: 1900-01-01

## 2023-05-01 ENCOUNTER — APPOINTMENT (OUTPATIENT)
Dept: RADIOLOGY | Facility: HOSPITAL | Age: 38
End: 2023-05-01
Payer: COMMERCIAL

## 2023-05-01 ENCOUNTER — OUTPATIENT (OUTPATIENT)
Dept: OUTPATIENT SERVICES | Facility: HOSPITAL | Age: 38
LOS: 1 days | End: 2023-05-01

## 2023-05-01 ENCOUNTER — RESULT REVIEW (OUTPATIENT)
Age: 38
End: 2023-05-01

## 2023-05-01 DIAGNOSIS — R10.13 EPIGASTRIC PAIN: ICD-10-CM

## 2023-05-01 PROCEDURE — 74240 X-RAY XM UPR GI TRC 1CNTRST: CPT | Mod: 26

## 2023-05-02 ENCOUNTER — NON-APPOINTMENT (OUTPATIENT)
Age: 38
End: 2023-05-02

## 2023-05-02 ENCOUNTER — APPOINTMENT (OUTPATIENT)
Dept: SURGERY | Facility: CLINIC | Age: 38
End: 2023-05-02
Payer: COMMERCIAL

## 2023-05-02 VITALS
HEART RATE: 76 BPM | BODY MASS INDEX: 29.16 KG/M2 | WEIGHT: 175 LBS | TEMPERATURE: 97.4 F | HEIGHT: 65 IN | DIASTOLIC BLOOD PRESSURE: 86 MMHG | OXYGEN SATURATION: 99 % | SYSTOLIC BLOOD PRESSURE: 140 MMHG

## 2023-05-02 DIAGNOSIS — K80.10 CALCULUS OF GALLBLADDER WITH CHRONIC CHOLECYSTITIS W/OUT OBSTRUCTION: ICD-10-CM

## 2023-05-02 DIAGNOSIS — R79.89 OTHER SPECIFIED ABNORMAL FINDINGS OF BLOOD CHEMISTRY: ICD-10-CM

## 2023-05-02 DIAGNOSIS — K80.20 CALCULUS OF GALLBLADDER W/OUT CHOLECYSTITIS W/OUT OBSTRUCTION: ICD-10-CM

## 2023-05-02 PROCEDURE — 99205 OFFICE O/P NEW HI 60 MIN: CPT

## 2023-05-02 RX ORDER — OMEPRAZOLE 40 MG/1
40 CAPSULE, DELAYED RELEASE ORAL
Qty: 30 | Refills: 3 | Status: DISCONTINUED | COMMUNITY
Start: 2023-03-14 | End: 2023-05-02

## 2023-05-04 PROBLEM — K80.20 GALLSTONES: Noted: 2018-03-22

## 2023-05-04 PROBLEM — R79.89 ABNORMAL LFTS: Status: RESOLVED | Noted: 2023-05-04 | Resolved: 2023-05-04

## 2023-05-04 PROBLEM — K80.10 CALCULUS OF GALLBLADDER WITH CHRONIC CHOLECYSTITIS: Status: ACTIVE | Noted: 2023-05-04

## 2023-05-04 NOTE — PLAN
[FreeTextEntry1] : History, physical examination to date, the imaging available as well as today's presentation are most consistent with symptomatic cholelithiasis with likely a component of more  chronic cholecystitis.  \par \par Given the associated discomfort and pain, limitations in lifestyle, the presence of a radiographically abnormal gallbladder with gallstones with a negative EGD/ UGI, her understandable anxiety with the risk of progression to acute cholecystitis, at least consideration of operative intervention does seem indicated and appropriate.\par \par The risks, benefits and nature of the operative intervention have been reviewed at length with Ms. FISHER and her  in detail, including but not limited to the possibility of conversion from laparoscopy to open surgery, the possibility of intra-operative imaging or the performance of a cholangiogram, the possibility of post-operative intra-abdominal drains, the possibility of biliary tract, visceral or vascular injury, the possibility of residual abdominal wall deformity, the possibility of infection or abscess or post-operative collection, and the approximate size, number and location of the typical or expected operative incisions.\par \par Ms. FISHER  is aware that subtotal or partial cholecystectomy, cholecystostomy with drainage or a discontinued diagnostic laparoscopy is also possible pending the intra-operative findings.  We have discussed that further abdominal findings or incidental pathology may be noted during laparoscopy.  \par \par We have reviewed that all abdominal surgery maintains the possibilities of future abdominal wall herniation, future intra-abdominal adhesions with the risk of bowel obstruction and increased risk during future operative interventions.  \par \par We have detailed that medical complications unrelated to the specific operative procedure such as cardiopulmonary issues, deep venous thrombosis with or without pulmonary embolism and urinary retention are possible.  \par \par We have specifically discussed the possibility of PostCholecystectomy Syndrome with residual abdominal pain or postprandial gastrointestinal complaints.  We have discussed that this is a process which may be amenable to conservative management or may ultimately require further future gastroenterology evaluation.  We have discussed that PostCholecystectomy Syndrome complaints may require long term dietary modifications with or without supplemental prescription medications.  \par \par We have reviewed that general anesthesia with intubation will be required and that pending post-operative progress, ambulatory care may be appropriate or a transition to inpatient care may be required.\par \par Ms. FISEHR  demonstrates good overall understanding and wishes to consider more fully prior to  proceeding.  While there are no specific concerns presently, I have encouraged follow-up phone calls or a return to the office at any to discuss future questions.  \par \par I remain available.\par \par Please note that more than 50% of face to face time was spent in counseling and coordination of care.\par \par

## 2023-05-04 NOTE — REVIEW OF SYSTEMS
[As Noted in HPI] : as noted in HPI [Anxiety] : anxiety [Negative] : Heme/Lymph [Feeling Poorly] : not feeling poorly [Feeling Tired] : not feeling tired [Depression] : no depression [de-identified] : regarding this issue and visit, but appropriately so...

## 2023-05-04 NOTE — DATA REVIEWED
[FreeTextEntry1] : EXAM: [id number] - US ABDOMEN COMPLETE  - ORDERED BY: ALANNAH VARGAS\par \par PROCEDURE DATE:  03/18/2023  \par  \par INTERPRETATION:  CLINICAL INFORMATION: Epigastric pain\par \par COMPARISON: None available.\par \par TECHNIQUE: Sonography of the abdomen.\par \par FINDINGS:\par Liver: There is mild diffuse hepatic steatosis. No focal hepatic lesion \par is seen.\par Bile ducts: Normal caliber. Common bile duct measures 3 mm.\par Gallbladder: Cholelithiasis. The gallbladder wall is normal in thickness. \par There was no sonographic Matrin's sign.\par Pancreas: Visualized portions are within normal limits.\par Spleen: 9.2 cm. Within normal limits.\par Right kidney: 10.4 cm. No hydronephrosis.\par Left kidney: 10.1 cm. No hydronephrosis.\par Ascites: None.\par Aorta and IVC: Visualized portions are within normal limits.\par \par IMPRESSION:\par Mild diffuse hepatic steatosis.\par Cholelithiasis. There is no ultrasound evidence for acute cholecystitis.\par \par --- End of Report ---\par \par SVEN PELAEZ MD; Attending Radiologist \par This document has been electronically signed. Mar 19 2023  4:40PM\par

## 2023-05-04 NOTE — PHYSICAL EXAM
[Respiratory Effort] : normal respiratory effort [Normal Rate and Rhythm] : normal rate and rhythm [No HSM] : no hepatosplenomegaly [No Rash or Lesion] : No rash or lesion [Alert] : alert [Oriented to Person] : oriented to person [Oriented to Place] : oriented to place [Oriented to Time] : oriented to time [Anxious] : anxious [Tender] : was nontender [Enlarged] : not enlarged [de-identified] : Appears well, no acute distress, ambulates easily into office and assumes examination table without need of assistance.  [de-identified] : Normocephalic and atraumatic.  [de-identified] : Supple with full range of motion.  [FreeTextEntry1] : No cervical, supraclavicular, axillary or inguinal adenopathy.  [de-identified] : Deferred.  [de-identified] : Full/ obese, but soft/ non tense.\par Well healed Pfannenstiel incision c/w  section.\par RUQ without visible mass.\par Epigastrium without palpable mass.\par Entire abdomen is non tender. [de-identified] : Normal external genitalia. [de-identified] : Deferred.  [de-identified] : Grossly symmetric and within normal limits without motor or sensory deficits.  [de-identified] : though quite appropriately so and interactive, pleasant overall...

## 2023-05-04 NOTE — HISTORY OF PRESENT ILLNESS
[de-identified] : Exceptionally pleasant patient presenting to the office with her  under the direction of her Gastroenterologist\par Ms. Anirudh Zhu reports an approximately two year history of infrequent epigastric pain and now a two month history of still intermittent but more frequent episodes.\par She has had a distant history of abnormal LFTs and subsequent MRCP.\par Ms. Anirudh Zhu underwent a non-suggestive EGD in 2019 and a more recent UGI which failed to show a hiatal hernia.\par Today, she requests consultation and information regarding possible cholecystectomy...

## 2023-07-13 ENCOUNTER — APPOINTMENT (OUTPATIENT)
Dept: PHYSICAL MEDICINE AND REHAB | Facility: CLINIC | Age: 38
End: 2023-07-13

## 2023-08-15 ENCOUNTER — APPOINTMENT (OUTPATIENT)
Dept: CARDIOLOGY | Facility: CLINIC | Age: 38
End: 2023-08-15

## 2023-09-07 NOTE — OB RN TRIAGE NOTE - NS_FINALEDD_OBGYN_ALL_OB_DT
13-Feb-2021 Methotrexate Pregnancy And Lactation Text: This medication is Pregnancy Category X and is known to cause fetal harm. This medication is excreted in breast milk.

## 2023-11-22 ENCOUNTER — APPOINTMENT (OUTPATIENT)
Dept: INTERNAL MEDICINE | Facility: CLINIC | Age: 38
End: 2023-11-22

## 2023-11-27 ENCOUNTER — APPOINTMENT (OUTPATIENT)
Dept: CARDIOLOGY | Facility: CLINIC | Age: 38
End: 2023-11-27
Payer: COMMERCIAL

## 2023-11-27 VITALS
SYSTOLIC BLOOD PRESSURE: 153 MMHG | WEIGHT: 178 LBS | HEART RATE: 79 BPM | HEIGHT: 65 IN | BODY MASS INDEX: 29.66 KG/M2 | DIASTOLIC BLOOD PRESSURE: 103 MMHG | OXYGEN SATURATION: 98 %

## 2023-11-27 VITALS — SYSTOLIC BLOOD PRESSURE: 128 MMHG | DIASTOLIC BLOOD PRESSURE: 82 MMHG

## 2023-11-27 DIAGNOSIS — I10 ESSENTIAL (PRIMARY) HYPERTENSION: ICD-10-CM

## 2023-11-27 PROCEDURE — 99214 OFFICE O/P EST MOD 30 MIN: CPT

## 2023-11-27 PROCEDURE — 93000 ELECTROCARDIOGRAM COMPLETE: CPT

## 2023-12-21 ENCOUNTER — APPOINTMENT (OUTPATIENT)
Dept: INTERNAL MEDICINE | Facility: CLINIC | Age: 38
End: 2023-12-21
Payer: COMMERCIAL

## 2023-12-21 VITALS
DIASTOLIC BLOOD PRESSURE: 86 MMHG | SYSTOLIC BLOOD PRESSURE: 131 MMHG | RESPIRATION RATE: 17 BRPM | HEART RATE: 67 BPM | WEIGHT: 193 LBS | TEMPERATURE: 98.1 F | BODY MASS INDEX: 32.15 KG/M2 | HEIGHT: 65 IN | OXYGEN SATURATION: 98 %

## 2023-12-21 DIAGNOSIS — Z00.00 ENCOUNTER FOR GENERAL ADULT MEDICAL EXAMINATION W/OUT ABNORMAL FINDINGS: ICD-10-CM

## 2023-12-21 DIAGNOSIS — R73.09 OTHER ABNORMAL GLUCOSE: ICD-10-CM

## 2023-12-21 PROCEDURE — 99395 PREV VISIT EST AGE 18-39: CPT

## 2023-12-21 RX ORDER — MULTIVITAMIN
TABLET ORAL DAILY
Refills: 0 | Status: ACTIVE | COMMUNITY

## 2023-12-26 ENCOUNTER — RX RENEWAL (OUTPATIENT)
Age: 38
End: 2023-12-26

## 2024-01-03 ENCOUNTER — TRANSCRIPTION ENCOUNTER (OUTPATIENT)
Age: 39
End: 2024-01-03

## 2024-01-03 LAB
ALBUMIN SERPL ELPH-MCNC: 4.6 G/DL
ALP BLD-CCNC: 66 U/L
ALT SERPL-CCNC: 17 U/L
ANION GAP SERPL CALC-SCNC: 11 MMOL/L
AST SERPL-CCNC: 19 U/L
BILIRUB SERPL-MCNC: 0.3 MG/DL
BUN SERPL-MCNC: 9 MG/DL
CALCIUM SERPL-MCNC: 9.4 MG/DL
CHLORIDE SERPL-SCNC: 104 MMOL/L
CHOLEST SERPL-MCNC: 203 MG/DL
CO2 SERPL-SCNC: 24 MMOL/L
CREAT SERPL-MCNC: 0.84 MG/DL
EGFR: 92 ML/MIN/1.73M2
ESTIMATED AVERAGE GLUCOSE: 111 MG/DL
GLUCOSE SERPL-MCNC: 106 MG/DL
HBA1C MFR BLD HPLC: 5.5 %
HCT VFR BLD CALC: 45.3 %
HDLC SERPL-MCNC: 60 MG/DL
HGB BLD-MCNC: 14.6 G/DL
LDLC SERPL CALC-MCNC: 118 MG/DL
MCHC RBC-ENTMCNC: 29 PG
MCHC RBC-ENTMCNC: 32.2 GM/DL
MCV RBC AUTO: 90.1 FL
NONHDLC SERPL-MCNC: 143 MG/DL
PLATELET # BLD AUTO: 426 K/UL
POTASSIUM SERPL-SCNC: 4.3 MMOL/L
PROT SERPL-MCNC: 7.9 G/DL
RBC # BLD: 5.03 M/UL
RBC # FLD: 12.7 %
SODIUM SERPL-SCNC: 139 MMOL/L
TRIGL SERPL-MCNC: 146 MG/DL
TSH SERPL-ACNC: 1.58 UIU/ML
WBC # FLD AUTO: 6.19 K/UL

## 2024-03-18 ENCOUNTER — RX RENEWAL (OUTPATIENT)
Age: 39
End: 2024-03-18

## 2024-03-26 NOTE — OB PROVIDER H&P - ATTENDING COMMENTS
25-Mar-2024 22:00
36 y/o para 4014 @ 36+4 wks gestation, single IUP.  sent from the office for further evaluation due to elevated blood pressures in this office 155/95, 153/100. Pt asymptomatic. Pt with history of elevated BPs and triage evaluation on 1/14th and noted with 2 elevated BP in triage and all labs normal. She completed 24 hr urine on 1/16/21 with 218mg protein. Plan admit for BP surveillance, repeat 24 hr urine , IV antibiotic x1 more for UTI and  glucose monitoring. Plan discussed with MFM Dr Graham.

## 2024-05-20 ENCOUNTER — APPOINTMENT (OUTPATIENT)
Dept: DERMATOLOGY | Facility: CLINIC | Age: 39
End: 2024-05-20
Payer: COMMERCIAL

## 2024-05-20 DIAGNOSIS — L81.0 POSTINFLAMMATORY HYPERPIGMENTATION: ICD-10-CM

## 2024-05-20 DIAGNOSIS — L81.9 DISORDER OF PIGMENTATION, UNSPECIFIED: ICD-10-CM

## 2024-05-20 DIAGNOSIS — L70.0 ACNE VULGARIS: ICD-10-CM

## 2024-05-20 PROCEDURE — 99214 OFFICE O/P EST MOD 30 MIN: CPT

## 2024-05-20 RX ORDER — CLINDAMYCIN PHOSPHATE 10 MG/ML
1 LOTION TOPICAL
Qty: 1 | Refills: 11 | Status: ACTIVE | COMMUNITY
Start: 2019-01-08 | End: 1900-01-01

## 2024-05-20 RX ORDER — TRETINOIN 0.5 MG/G
0.05 CREAM TOPICAL
Qty: 1 | Refills: 5 | Status: ACTIVE | COMMUNITY
Start: 2024-05-20 | End: 1900-01-01

## 2024-05-21 NOTE — HISTORY OF PRESENT ILLNESS
[FreeTextEntry1] : fu pt: acne [de-identified] : 39 y/o F w/ acne here for fu: #light spot in NLF, left side - no itch, just started a few months ago; does not remember a preceding rash - has seb derm but mild in scalp only - unilateral, no involvement anywhere else  # Acne on face - using clindamycin lotion in the morning; BPO wash and tretinoin 0.025% cream every night, using moisturizer subsequently  - acne flares with periods; has regular periods, not interested in oral meds - uses Cerave AM sunscreen 30 spf - using leo's choice exfoliant

## 2024-05-21 NOTE — ASSESSMENT
[FreeTextEntry1] : # Acne vulgaris, mild exacerbation of chronic disease, inflammatory # PIH - Reviewed risks (as well as mitigation strategies for adverse drug events as applicable), benefits, and alternatives of therapy - would recommend spironolactone for hormonal component but pt declines - cont BPO 5% wash to qAM daily, SED - cont clindamycin 1% lotion to AA QAM, SED - increase to tretinoin 0.05% cream to AA on face qhs, SED. Use every 2-3 days initially and then titrate up to once nightly as tolerated (don't use if pregnant)  #hypopigmented patch, L nasolabial fold - clinically consistent with scar vs post inflammatory hypopigmentation - no itch or any symptoms - stop exfoliant for now, gentle skincare and monitor - RTC if worsening  RTC 3 months

## 2024-05-21 NOTE — PHYSICAL EXAM
[Declined] : declined [FreeTextEntry3] : pink papules on the chin dark brown hyperpigmented macules on bilateral jaw  hypopigmented smooth patch in L nasolabial fold

## 2024-05-28 ENCOUNTER — APPOINTMENT (OUTPATIENT)
Dept: CARDIOLOGY | Facility: CLINIC | Age: 39
End: 2024-05-28

## 2024-06-05 ENCOUNTER — RX RENEWAL (OUTPATIENT)
Age: 39
End: 2024-06-05

## 2024-06-05 RX ORDER — AMLODIPINE BESYLATE 5 MG/1
5 TABLET ORAL
Qty: 90 | Refills: 1 | Status: ACTIVE | COMMUNITY
Start: 2023-12-26

## 2024-07-16 ENCOUNTER — TRANSCRIPTION ENCOUNTER (OUTPATIENT)
Age: 39
End: 2024-07-16

## 2024-08-19 ENCOUNTER — APPOINTMENT (OUTPATIENT)
Dept: ORTHOPEDIC SURGERY | Facility: CLINIC | Age: 39
End: 2024-08-19
Payer: COMMERCIAL

## 2024-08-19 VITALS — WEIGHT: 170 LBS | BODY MASS INDEX: 28.32 KG/M2 | HEIGHT: 65 IN

## 2024-08-19 DIAGNOSIS — M25.512 PAIN IN LEFT SHOULDER: ICD-10-CM

## 2024-08-19 DIAGNOSIS — G89.29 PAIN IN LEFT SHOULDER: ICD-10-CM

## 2024-08-19 PROCEDURE — 99213 OFFICE O/P EST LOW 20 MIN: CPT

## 2024-08-19 RX ORDER — CYCLOBENZAPRINE HYDROCHLORIDE 5 MG/1
5 TABLET, FILM COATED ORAL
Qty: 30 | Refills: 0 | Status: ACTIVE | COMMUNITY
Start: 2024-08-19 | End: 1900-01-01

## 2024-08-20 PROBLEM — M25.512 CHRONIC PERISCAPULAR PAIN ON LEFT SIDE: Status: ACTIVE | Noted: 2024-08-20

## 2024-08-20 NOTE — PHYSICAL EXAM
[de-identified] : Left shoulder exam:  Inspection: No malalignment, No defects, No atrophy Skin: No masses, No lesions Neck: Negative Spurling, full ROM, no pain with ROM AROM: FF to 180, abduction to 90, ER to 70, IR to upper lumbar Painful arc ROM: Pain with terminal motion Tenderness: + trapezius pain, +tenderness over medial scapula. No bicipital tenderness, no tenderness to greater tuberosity/RTC insertion, no anterior shoulder/lesser tuberosity tenderness Strength: 5/5 ER, 5/5 IR in adduction, 5/5 supraspinatus testing, negative Herndon's test AC joint: No TTP/pain with cross arm testing Biceps: Speed Negative, Yergason Negative  Impingement test: Negative Nieto, Negative Neer Vasc: 2+ radial pulse  Stability: Stable  Neuro: AIN, PIN, Ulnar nerve intact to motor Sensation: Intact to light touch throughout  [de-identified] : MRI left shoulder dated 5.12.2021 shows mild RTC tendinopathy.

## 2024-08-20 NOTE — DISCUSSION/SUMMARY
[de-identified] : 40 y/o female with left periscapular pain.  Presents for evaluation of chronic left periscapular pain. Patient has myofascial discomfort through the left upper extremity throughout the periscapular/cervical/shoulder region.  Majority of the symptoms appear to be related through the trapezius, and concern is for now chronicity of disease.  The does not appear to be any significant internal derangement to the left shoulder on prior imaging, and we discussed that her symptoms are likely not related to any internal derangement to the shoulder joint or cervical region per prior orthopedic spine evaluation. Treatment for this condition is through conservative/nonoperative means, including anti-inflammatory medication, physical therapy, massage therapy, acupuncture, and chiropractic care  Recommendation: Conservative modalities as outlined above. Begin a trial of PT. Rx given. Cyclobenzaprine Rx given. Referral to pain management  Follow-up as needed

## 2024-08-20 NOTE — ADDENDUM
[FreeTextEntry1] : This note was written by Kaylin Dove on 08/19/2024 acting solely as a scribe for Dr. Anthony Begum.  All medical record entries made by the Scribe were at my, Dr. Anthony Begum, direction and personally dictated by me on 08/19/2024. I have personally reviewed the chart and agree that the record accurately reflects my personal performance of the history, physical exam, assessment and plan.

## 2024-08-20 NOTE — DISCUSSION/SUMMARY
[de-identified] : 40 y/o female with left periscapular pain.  Presents for evaluation of chronic left periscapular pain. Patient has myofascial discomfort through the left upper extremity throughout the periscapular/cervical/shoulder region.  Majority of the symptoms appear to be related through the trapezius, and concern is for now chronicity of disease.  The does not appear to be any significant internal derangement to the left shoulder on prior imaging, and we discussed that her symptoms are likely not related to any internal derangement to the shoulder joint or cervical region per prior orthopedic spine evaluation. Treatment for this condition is through conservative/nonoperative means, including anti-inflammatory medication, physical therapy, massage therapy, acupuncture, and chiropractic care  Recommendation: Conservative modalities as outlined above. Begin a trial of PT. Rx given. Cyclobenzaprine Rx given. Referral to pain management  Follow-up as needed
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
## 2024-08-20 NOTE — PHYSICAL EXAM
[de-identified] : Left shoulder exam:  Inspection: No malalignment, No defects, No atrophy Skin: No masses, No lesions Neck: Negative Spurling, full ROM, no pain with ROM AROM: FF to 180, abduction to 90, ER to 70, IR to upper lumbar Painful arc ROM: Pain with terminal motion Tenderness: + trapezius pain, +tenderness over medial scapula. No bicipital tenderness, no tenderness to greater tuberosity/RTC insertion, no anterior shoulder/lesser tuberosity tenderness Strength: 5/5 ER, 5/5 IR in adduction, 5/5 supraspinatus testing, negative Circle's test AC joint: No TTP/pain with cross arm testing Biceps: Speed Negative, Yergason Negative  Impingement test: Negative Nieto, Negative Neer Vasc: 2+ radial pulse  Stability: Stable  Neuro: AIN, PIN, Ulnar nerve intact to motor Sensation: Intact to light touch throughout  [de-identified] : MRI left shoulder dated 5.12.2021 shows mild RTC tendinopathy.
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
## 2024-08-21 ENCOUNTER — APPOINTMENT (OUTPATIENT)
Dept: ORTHOPEDIC SURGERY | Facility: CLINIC | Age: 39
End: 2024-08-21

## 2024-10-10 ENCOUNTER — APPOINTMENT (OUTPATIENT)
Dept: INTERNAL MEDICINE | Facility: CLINIC | Age: 39
End: 2024-10-10

## 2024-11-06 NOTE — ED CDU PROVIDER INITIAL DAY NOTE - DETAILS
ELEVATED LIVER ENZYMES  -IVF  -PAIN CONTROL  -SERIAL ABDOMINAL EXAMS  -GUILLE AGGARWAL  -CASE D/W ATTENDING NAUSEA/VOMITING  -IVF  -PAIN CONTROL  -SERIAL ABDOMINAL EXAMS  -GUILLE AGGARWAL  -MRCP  -CASE D/W ATTENDING Dr. CONLEY Applied

## 2024-12-25 PROBLEM — F10.90 ALCOHOL USE: Status: INACTIVE | Noted: 2017-11-30

## 2025-01-10 ENCOUNTER — APPOINTMENT (OUTPATIENT)
Dept: INTERNAL MEDICINE | Facility: CLINIC | Age: 40
End: 2025-01-10

## 2025-01-15 ENCOUNTER — APPOINTMENT (OUTPATIENT)
Dept: DERMATOLOGY | Facility: CLINIC | Age: 40
End: 2025-01-15
Payer: COMMERCIAL

## 2025-01-15 DIAGNOSIS — L90.5 SCAR CONDITIONS AND FIBROSIS OF SKIN: ICD-10-CM

## 2025-01-15 DIAGNOSIS — L81.0 POSTINFLAMMATORY HYPERPIGMENTATION: ICD-10-CM

## 2025-01-15 DIAGNOSIS — L70.0 ACNE VULGARIS: ICD-10-CM

## 2025-01-15 PROCEDURE — 99214 OFFICE O/P EST MOD 30 MIN: CPT

## 2025-01-15 RX ORDER — TACROLIMUS 1 MG/G
0.1 OINTMENT TOPICAL
Qty: 1 | Refills: 0 | Status: ACTIVE | COMMUNITY
Start: 2025-01-15 | End: 1900-01-01

## 2025-01-15 RX ORDER — CLASCOTERONE 1 G/100G
1 CREAM TOPICAL
Qty: 1 | Refills: 3 | Status: ACTIVE | COMMUNITY
Start: 2025-01-15 | End: 1900-01-01

## 2025-01-15 RX ORDER — TRETINOIN 1 MG/G
0.1 CREAM TOPICAL
Qty: 1 | Refills: 6 | Status: ACTIVE | COMMUNITY
Start: 2025-01-15 | End: 1900-01-01

## 2025-02-03 NOTE — OB PROVIDER TRIAGE NOTE - NO SIGNIFICANT PAST SURGICAL HISTORY
<<----- Click to add NO significant Past Surgical History Skyrizi Monitoring Guidelines: A yearly test for tuberculosis is required while taking Skyrizi. Is Cyclosporine Contraindicated?: No Quality Of Life Score (16 And Older): thick painful scales. painful and bleeding.  MODERATE Patient Weight In Pounds: 177 Diagnosis (Required): Psoriasis Bsa (10% Or More): 10 Skyrizi Dosing: 150 mg SC week 0 and week 4 then every 12 weeks thereafter Include Weight Question: Yes - Pounds Detail Level: Zone Pregnancy And Lactation Warning Text: The risk during pregnancy and breastfeeding is uncertain with this medication.

## 2025-02-21 RX ORDER — AZELAIC ACID 0.15 G/G
15 GEL TOPICAL DAILY
Qty: 1 | Refills: 0 | Status: ACTIVE | COMMUNITY
Start: 2025-02-21 | End: 1900-01-01

## 2025-03-10 ENCOUNTER — TRANSCRIPTION ENCOUNTER (OUTPATIENT)
Age: 40
End: 2025-03-10

## 2025-03-10 ENCOUNTER — OUTPATIENT (OUTPATIENT)
Dept: OUTPATIENT SERVICES | Facility: HOSPITAL | Age: 40
LOS: 1 days | End: 2025-03-10
Payer: COMMERCIAL

## 2025-03-10 ENCOUNTER — APPOINTMENT (OUTPATIENT)
Dept: MRI IMAGING | Facility: IMAGING CENTER | Age: 40
End: 2025-03-10
Payer: COMMERCIAL

## 2025-03-10 DIAGNOSIS — Z00.8 ENCOUNTER FOR OTHER GENERAL EXAMINATION: ICD-10-CM

## 2025-03-10 PROCEDURE — 73221 MRI JOINT UPR EXTREM W/O DYE: CPT

## 2025-03-10 PROCEDURE — 73221 MRI JOINT UPR EXTREM W/O DYE: CPT | Mod: 26,LT

## 2025-03-21 ENCOUNTER — OUTPATIENT (OUTPATIENT)
Dept: OUTPATIENT SERVICES | Facility: HOSPITAL | Age: 40
LOS: 1 days | End: 2025-03-21
Payer: COMMERCIAL

## 2025-03-21 ENCOUNTER — APPOINTMENT (OUTPATIENT)
Dept: MRI IMAGING | Facility: IMAGING CENTER | Age: 40
End: 2025-03-21
Payer: COMMERCIAL

## 2025-03-21 DIAGNOSIS — Z00.8 ENCOUNTER FOR OTHER GENERAL EXAMINATION: ICD-10-CM

## 2025-03-21 PROCEDURE — 71550 MRI CHEST W/O DYE: CPT | Mod: 26,LT

## 2025-03-21 PROCEDURE — 71550 MRI CHEST W/O DYE: CPT

## 2025-03-24 ENCOUNTER — NON-APPOINTMENT (OUTPATIENT)
Age: 40
End: 2025-03-24

## 2025-03-24 ENCOUNTER — APPOINTMENT (OUTPATIENT)
Dept: INTERNAL MEDICINE | Facility: CLINIC | Age: 40
End: 2025-03-24
Payer: COMMERCIAL

## 2025-03-24 VITALS
HEART RATE: 63 BPM | SYSTOLIC BLOOD PRESSURE: 132 MMHG | HEIGHT: 65 IN | TEMPERATURE: 98.3 F | RESPIRATION RATE: 17 BRPM | DIASTOLIC BLOOD PRESSURE: 87 MMHG | OXYGEN SATURATION: 99 % | BODY MASS INDEX: 29.32 KG/M2 | WEIGHT: 176 LBS

## 2025-03-24 DIAGNOSIS — R73.09 OTHER ABNORMAL GLUCOSE: ICD-10-CM

## 2025-03-24 DIAGNOSIS — M25.512 PAIN IN LEFT SHOULDER: ICD-10-CM

## 2025-03-24 DIAGNOSIS — O99.810 ABNORMAL GLUCOSE COMPLICATING PREGNANCY: ICD-10-CM

## 2025-03-24 DIAGNOSIS — R73.01 IMPAIRED FASTING GLUCOSE: ICD-10-CM

## 2025-03-24 DIAGNOSIS — R29.898 OTHER SYMPTOMS AND SIGNS INVOLVING THE MUSCULOSKELETAL SYSTEM: ICD-10-CM

## 2025-03-24 DIAGNOSIS — L90.5 SCAR CONDITIONS AND FIBROSIS OF SKIN: ICD-10-CM

## 2025-03-24 DIAGNOSIS — R20.0 ANESTHESIA OF SKIN: ICD-10-CM

## 2025-03-24 DIAGNOSIS — M79.606 PAIN IN LEG, UNSPECIFIED: ICD-10-CM

## 2025-03-24 DIAGNOSIS — M75.50 BURSITIS OF UNSPECIFIED SHOULDER: ICD-10-CM

## 2025-03-24 DIAGNOSIS — Z86.79 PERSONAL HISTORY OF OTHER DISEASES OF THE CIRCULATORY SYSTEM: ICD-10-CM

## 2025-03-24 DIAGNOSIS — Z87.2 PERSONAL HISTORY OF DISEASES OF THE SKIN AND SUBCUTANEOUS TISSUE: ICD-10-CM

## 2025-03-24 DIAGNOSIS — E78.5 HYPERLIPIDEMIA, UNSPECIFIED: ICD-10-CM

## 2025-03-24 DIAGNOSIS — G56.80 OTHER SPECIFIED MONONEUROPATHIES OF UNSPECIFIED UPPER LIMB: ICD-10-CM

## 2025-03-24 DIAGNOSIS — L81.0 POSTINFLAMMATORY HYPERPIGMENTATION: ICD-10-CM

## 2025-03-24 DIAGNOSIS — Z87.898 PERSONAL HISTORY OF OTHER SPECIFIED CONDITIONS: ICD-10-CM

## 2025-03-24 DIAGNOSIS — I10 ESSENTIAL (PRIMARY) HYPERTENSION: ICD-10-CM

## 2025-03-24 DIAGNOSIS — G89.29 PAIN IN LEFT SHOULDER: ICD-10-CM

## 2025-03-24 DIAGNOSIS — M62.81 MUSCLE WEAKNESS (GENERALIZED): ICD-10-CM

## 2025-03-24 DIAGNOSIS — M67.912 UNSPECIFIED DISORDER OF SYNOVIUM AND TENDON, LEFT SHOULDER: ICD-10-CM

## 2025-03-24 DIAGNOSIS — Z00.00 ENCOUNTER FOR GENERAL ADULT MEDICAL EXAMINATION W/OUT ABNORMAL FINDINGS: ICD-10-CM

## 2025-03-24 PROCEDURE — 99395 PREV VISIT EST AGE 18-39: CPT

## 2025-03-24 PROCEDURE — 93000 ELECTROCARDIOGRAM COMPLETE: CPT

## 2025-04-01 LAB
25(OH)D3 SERPL-MCNC: 36.2 NG/ML
ALBUMIN SERPL ELPH-MCNC: 4.5 G/DL
ALP BLD-CCNC: 76 U/L
ALT SERPL-CCNC: 26 U/L
ANION GAP SERPL CALC-SCNC: 14 MMOL/L
AST SERPL-CCNC: 29 U/L
BILIRUB SERPL-MCNC: 0.4 MG/DL
BUN SERPL-MCNC: 8 MG/DL
CALCIUM SERPL-MCNC: 9.4 MG/DL
CHLORIDE SERPL-SCNC: 104 MMOL/L
CHOLEST SERPL-MCNC: 200 MG/DL
CO2 SERPL-SCNC: 21 MMOL/L
CREAT SERPL-MCNC: 0.92 MG/DL
EGFRCR SERPLBLD CKD-EPI 2021: 82 ML/MIN/1.73M2
ESTIMATED AVERAGE GLUCOSE: 111 MG/DL
GLUCOSE SERPL-MCNC: 107 MG/DL
HBA1C MFR BLD HPLC: 5.5 %
HCT VFR BLD CALC: 45.2 %
HDLC SERPL-MCNC: 67 MG/DL
HGB BLD-MCNC: 15 G/DL
LDLC SERPL-MCNC: 115 MG/DL
MCHC RBC-ENTMCNC: 29.8 PG
MCHC RBC-ENTMCNC: 33.2 G/DL
MCV RBC AUTO: 89.9 FL
NONHDLC SERPL-MCNC: 133 MG/DL
PLATELET # BLD AUTO: 403 K/UL
POTASSIUM SERPL-SCNC: 4.4 MMOL/L
PROT SERPL-MCNC: 7.8 G/DL
RBC # BLD: 5.03 M/UL
RBC # FLD: 12.9 %
SODIUM SERPL-SCNC: 140 MMOL/L
TRIGL SERPL-MCNC: 101 MG/DL
TSH SERPL-ACNC: 1.15 UIU/ML
WBC # FLD AUTO: 6.24 K/UL

## 2025-04-03 ENCOUNTER — TRANSCRIPTION ENCOUNTER (OUTPATIENT)
Age: 40
End: 2025-04-03

## 2025-04-04 ENCOUNTER — TRANSCRIPTION ENCOUNTER (OUTPATIENT)
Age: 40
End: 2025-04-04

## 2025-04-04 DIAGNOSIS — R94.6 ABNORMAL RESULTS OF THYROID FUNCTION STUDIES: ICD-10-CM

## 2025-04-09 ENCOUNTER — APPOINTMENT (OUTPATIENT)
Dept: DERMATOLOGY | Facility: CLINIC | Age: 40
End: 2025-04-09

## 2025-04-12 ENCOUNTER — OUTPATIENT (OUTPATIENT)
Dept: OUTPATIENT SERVICES | Facility: HOSPITAL | Age: 40
LOS: 1 days | End: 2025-04-12
Payer: COMMERCIAL

## 2025-04-12 ENCOUNTER — APPOINTMENT (OUTPATIENT)
Dept: ULTRASOUND IMAGING | Facility: IMAGING CENTER | Age: 40
End: 2025-04-12

## 2025-04-12 DIAGNOSIS — R94.6 ABNORMAL RESULTS OF THYROID FUNCTION STUDIES: ICD-10-CM

## 2025-04-12 DIAGNOSIS — Z00.8 ENCOUNTER FOR OTHER GENERAL EXAMINATION: ICD-10-CM

## 2025-04-12 PROCEDURE — 76536 US EXAM OF HEAD AND NECK: CPT | Mod: 26

## 2025-04-12 PROCEDURE — 76536 US EXAM OF HEAD AND NECK: CPT

## 2025-04-24 NOTE — OB PROVIDER H&P - [id number]_ALL_OB_NU
Patient called in to schedule her iron infusion gave her the time of 9:30 on 4-29. Patient states she will call back in 30 min to schedule due to starting new job and need to see if she can get time off. Informed patient that we can not guarantee if date and time was still be available. Patient understood    [id number]

## 2025-04-28 ENCOUNTER — TRANSCRIPTION ENCOUNTER (OUTPATIENT)
Age: 40
End: 2025-04-28

## 2025-05-13 NOTE — OB RN DELIVERY SUMMARY - BABY A SEX
Called patient for (2nd) weekly check in with Care Transition Program. (2nd) attempt. Left message. Will try again at a later time.   Male

## 2025-06-02 ENCOUNTER — APPOINTMENT (OUTPATIENT)
Dept: CARDIOLOGY | Facility: CLINIC | Age: 40
End: 2025-06-02

## 2025-06-11 ENCOUNTER — APPOINTMENT (OUTPATIENT)
Dept: CARDIOLOGY | Facility: CLINIC | Age: 40
End: 2025-06-11

## 2025-07-15 NOTE — OB PROVIDER DELIVERY SUMMARY - NSCERVICA DILATATIONATCSA_OBGYN_ALL_OB_NU
Sunscreen Recommendations: SPF 30 or higher Detail Level: Zone Detail Level: Generalized Detail Level: Detailed 9